# Patient Record
Sex: FEMALE | Race: WHITE | NOT HISPANIC OR LATINO | URBAN - METROPOLITAN AREA
[De-identification: names, ages, dates, MRNs, and addresses within clinical notes are randomized per-mention and may not be internally consistent; named-entity substitution may affect disease eponyms.]

---

## 2018-01-02 ENCOUNTER — LAB REQUISITION (OUTPATIENT)
Dept: LAB | Facility: OTHER | Age: 48
End: 2018-01-02

## 2018-01-02 DIAGNOSIS — Z01.84 IMMUNITY STATUS TESTING: ICD-10-CM

## 2018-01-03 LAB
MEV IGG SER IA-ACNC: POSITIVE
MUV IGG SER IA-ACNC: POSITIVE
RUBV IGG SERPL IA-ACNC: POSITIVE
VZV IGG SER IA-ACNC: POSITIVE

## 2018-07-27 ENCOUNTER — APPOINTMENT (OUTPATIENT)
Dept: WOMENS IMAGING | Facility: HOSPITAL | Age: 48
End: 2018-07-27

## 2018-07-27 PROCEDURE — 77062 BREAST TOMOSYNTHESIS BI: CPT | Performed by: RADIOLOGY

## 2018-07-27 PROCEDURE — G0279 TOMOSYNTHESIS, MAMMO: HCPCS | Performed by: RADIOLOGY

## 2018-07-27 PROCEDURE — 77066 DX MAMMO INCL CAD BI: CPT | Performed by: RADIOLOGY

## 2018-07-27 PROCEDURE — 76641 ULTRASOUND BREAST COMPLETE: CPT | Performed by: RADIOLOGY

## 2018-08-20 ENCOUNTER — APPOINTMENT (OUTPATIENT)
Dept: WOMENS IMAGING | Facility: HOSPITAL | Age: 48
End: 2018-08-20

## 2018-08-20 PROCEDURE — 76942 ECHO GUIDE FOR BIOPSY: CPT | Performed by: RADIOLOGY

## 2018-08-20 PROCEDURE — 19083 BX BREAST 1ST LESION US IMAG: CPT | Performed by: RADIOLOGY

## 2018-08-20 PROCEDURE — 19000 PUNCTURE ASPIR CYST BREAST: CPT | Performed by: RADIOLOGY

## 2018-10-29 ENCOUNTER — LAB REQUISITION (OUTPATIENT)
Dept: LAB | Facility: OTHER | Age: 48
End: 2018-10-29

## 2018-10-29 DIAGNOSIS — Z01.84 IMMUNITY STATUS TESTING: ICD-10-CM

## 2018-10-29 PROCEDURE — 86706 HEP B SURFACE ANTIBODY: CPT | Performed by: PREVENTIVE MEDICINE

## 2018-10-30 LAB — HBV SURFACE AB SER RIA-ACNC: REACTIVE

## 2019-09-05 ENCOUNTER — LAB REQUISITION (OUTPATIENT)
Dept: LAB | Facility: OTHER | Age: 49
End: 2019-09-05

## 2019-09-05 DIAGNOSIS — Z00.00 ANNUAL PHYSICAL EXAM: ICD-10-CM

## 2019-09-05 LAB — HBV SURFACE AB SER RIA-ACNC: NORMAL

## 2019-09-05 PROCEDURE — 86706 HEP B SURFACE ANTIBODY: CPT | Performed by: PHYSICAL MEDICINE & REHABILITATION

## 2019-09-05 PROCEDURE — 86481 TB AG RESPONSE T-CELL SUSP: CPT | Performed by: PHYSICAL MEDICINE & REHABILITATION

## 2019-09-07 LAB
TSPOT INTERPRETATION: NEGATIVE
TSPOT NIL CONTROL INTERPRETATION: NORMAL
TSPOT PANEL A: 0
TSPOT PANEL B: 0
TSPOT POS CONTROL INTERPRETATION: NORMAL

## 2020-06-11 ENCOUNTER — APPOINTMENT (OUTPATIENT)
Dept: WOMENS IMAGING | Facility: HOSPITAL | Age: 50
End: 2020-06-11

## 2020-06-11 PROCEDURE — 77063 BREAST TOMOSYNTHESIS BI: CPT | Performed by: RADIOLOGY

## 2020-06-11 PROCEDURE — 77067 SCR MAMMO BI INCL CAD: CPT | Performed by: RADIOLOGY

## 2020-07-07 ENCOUNTER — APPOINTMENT (OUTPATIENT)
Dept: WOMENS IMAGING | Facility: HOSPITAL | Age: 50
End: 2020-07-07

## 2020-07-07 PROCEDURE — 76641 ULTRASOUND BREAST COMPLETE: CPT | Performed by: RADIOLOGY

## 2020-07-07 PROCEDURE — 77061 BREAST TOMOSYNTHESIS UNI: CPT | Performed by: RADIOLOGY

## 2020-07-07 PROCEDURE — 77065 DX MAMMO INCL CAD UNI: CPT | Performed by: RADIOLOGY

## 2020-07-07 PROCEDURE — G0279 TOMOSYNTHESIS, MAMMO: HCPCS | Performed by: RADIOLOGY

## 2020-07-16 ENCOUNTER — APPOINTMENT (OUTPATIENT)
Dept: WOMENS IMAGING | Facility: HOSPITAL | Age: 50
End: 2020-07-16

## 2020-07-16 PROCEDURE — 19081 BX BREAST 1ST LESION STRTCTC: CPT | Performed by: RADIOLOGY

## 2020-07-17 ENCOUNTER — TELEPHONE (OUTPATIENT)
Dept: SURGERY | Facility: CLINIC | Age: 50
End: 2020-07-17

## 2020-07-23 ENCOUNTER — TELEPHONE (OUTPATIENT)
Dept: SURGERY | Facility: CLINIC | Age: 50
End: 2020-07-23

## 2020-07-24 ENCOUNTER — OFFICE VISIT (OUTPATIENT)
Dept: SURGERY | Facility: CLINIC | Age: 50
End: 2020-07-24

## 2020-07-24 ENCOUNTER — PREP FOR SURGERY (OUTPATIENT)
Dept: OTHER | Facility: HOSPITAL | Age: 50
End: 2020-07-24

## 2020-07-24 VITALS
HEIGHT: 63 IN | WEIGHT: 202 LBS | DIASTOLIC BLOOD PRESSURE: 86 MMHG | BODY MASS INDEX: 35.79 KG/M2 | SYSTOLIC BLOOD PRESSURE: 126 MMHG | TEMPERATURE: 98 F | HEART RATE: 101 BPM | OXYGEN SATURATION: 97 %

## 2020-07-24 DIAGNOSIS — N64.89 RADIAL SCAR OF RIGHT BREAST: Primary | ICD-10-CM

## 2020-07-24 DIAGNOSIS — N64.89 BREAST ASYMMETRY: ICD-10-CM

## 2020-07-24 DIAGNOSIS — E66.09 CLASS 2 OBESITY DUE TO EXCESS CALORIES WITHOUT SERIOUS COMORBIDITY WITH BODY MASS INDEX (BMI) OF 35.0 TO 35.9 IN ADULT: ICD-10-CM

## 2020-07-24 DIAGNOSIS — N60.11 FIBROCYSTIC DISEASE OF RIGHT BREAST: ICD-10-CM

## 2020-07-24 DIAGNOSIS — T14.8XXA HEMATOMA: ICD-10-CM

## 2020-07-24 PROCEDURE — 99242 OFF/OP CONSLTJ NEW/EST SF 20: CPT | Performed by: SURGERY

## 2020-07-24 RX ORDER — DIAZEPAM 5 MG/1
10 TABLET ORAL ONCE
Status: CANCELLED | OUTPATIENT
Start: 2020-10-01 | End: 2020-07-24

## 2020-07-24 RX ORDER — CEFAZOLIN SODIUM 2 G/100ML
2 INJECTION, SOLUTION INTRAVENOUS ONCE
Status: CANCELLED | OUTPATIENT
Start: 2020-10-01 | End: 2020-07-24

## 2020-07-24 RX ORDER — ALPRAZOLAM 0.5 MG/1
1 TABLET ORAL 3 TIMES DAILY
COMMUNITY

## 2020-07-24 RX ORDER — ACETAMINOPHEN 325 MG/1
650 TABLET ORAL EVERY 6 HOURS PRN
COMMUNITY

## 2020-07-24 RX ORDER — DULOXETIN HYDROCHLORIDE 60 MG/1
60 CAPSULE, DELAYED RELEASE ORAL DAILY
COMMUNITY
Start: 2020-05-24

## 2020-07-24 RX ORDER — SODIUM CHLORIDE, SODIUM LACTATE, POTASSIUM CHLORIDE, CALCIUM CHLORIDE 600; 310; 30; 20 MG/100ML; MG/100ML; MG/100ML; MG/100ML
100 INJECTION, SOLUTION INTRAVENOUS CONTINUOUS
Status: CANCELLED | OUTPATIENT
Start: 2020-10-01

## 2020-07-24 RX ORDER — ONDANSETRON 4 MG/1
4 TABLET, FILM COATED ORAL EVERY 8 HOURS PRN
Qty: 10 TABLET | Refills: 1 | Status: SHIPPED | OUTPATIENT
Start: 2020-07-24

## 2020-07-24 RX ORDER — DIPHENHYDRAMINE HCL 25 MG
25 CAPSULE ORAL EVERY 6 HOURS PRN
COMMUNITY

## 2020-07-24 RX ORDER — PANTOPRAZOLE SODIUM 40 MG/1
40 TABLET, DELAYED RELEASE ORAL DAILY
COMMUNITY
Start: 2020-06-25

## 2020-07-24 RX ORDER — POLYETHYLENE GLYCOL 3350 17 G/17G
17 POWDER, FOR SOLUTION ORAL DAILY
Qty: 1 EACH | Refills: 0 | Status: SHIPPED | OUTPATIENT
Start: 2020-07-24

## 2020-07-24 RX ORDER — NAPROXEN SODIUM 220 MG
220 TABLET ORAL 2 TIMES DAILY
COMMUNITY
End: 2020-10-01 | Stop reason: HOSPADM

## 2020-07-24 RX ORDER — HYDROCODONE BITARTRATE AND ACETAMINOPHEN 5; 325 MG/1; MG/1
TABLET ORAL
Qty: 15 TABLET | Refills: 0 | Status: SHIPPED | OUTPATIENT
Start: 2020-07-24

## 2020-07-24 NOTE — PROGRESS NOTES
Chief Complaint: Tori Sarmiento is a 49 y.o. female who was seen in consultation at the request of Tamara Mccallum MD  for radial sclerosing lesion  and abnormal breast imaging    History of Present Illness:  Patient presents with abnormal breast imaging and RIGHT breast biopsy results. She noted no new masses, skin changes, nipple discharge, nipple changes prior to her most recent imaging.    Her most recent imaging includes the followin/11/20 [facility]   BILATERAL SCREENING MAMMOGRAM WITH TMOSYNTHESIS   TORI SARMIENTO   Scattered areas of fibroglandular density. Finding 1: developing asymmetry with associated calcifications seen in the posterior one third 9:30 o’clock region of the right breast. Finding 2: stable biopsy clip upper outer region right breast. IMPRESSION: Finding 1: developing asymmetry in the right breast requires additional evaluation. Finding 2: benign negative.  BIRADS Category 0    20 [facility]   RIGHT BREAST DIAGNOSTIC MAMMOGRAM WITH TOMOSYNTHESIS  TORI SARMIENTO   Scattered areas if fibroglandular density. Finding 1: additional evaluation developing asymmetry in the right breast, 9:30 o’clock, there is a developing asymmetry measuring 30 mm with associated amorphous calcifications in the posterior one third 9:30 o’clock region of the right breast. There is suggestion of associated architectural distortion. Finding 2: there is a biopsy clip seen in the upper outer region of the right breast. RIGHT BREAST ULTRASOUND: Finding 1: normal tissue in the posterior one third 9:30 o’clock region of the right breast. There is no sonographic correlate. Finding 2: there are no suspicious masses, areas of focal shadowing or distortion seen. IMPRESSION: Finding 1: Developing asymmetry with amorphous calcifications and distortion, together SPANNING APPROXIMATLEY 30 MM, IS SUSPICIOUS. STEREOTACTIC BIOPSY IS RECEOMMED. BIRADS Category 4C.     She had a biopsy on the following day that showed:     18 [facility]   ULTRASOUND GUIDED BIOPSY   VIVIAN HO   Right breast at 9:00. A 14-gauge Achieve. A total of 5 cores [place] shaped tissue marker was placed. Demonstrate the biopsy clip to be in satisfactory position. ADDENDUM: right breast 9:00 position fibrocystic changes with Usual Hyperplasia and Apocrine Metaplasia. Negative for Carcinoma. Pathology results are radiology-pathology concordant. RECOMMENDATIONS: Bilateral breast ultrasound in 6 months.     08/20/18 [facility]  PATHOLOGY REPORT   VIVIAN HO   Right breast 9 o’clock: fibrocystic changes with usual duct hyperplasia and apocrine metaplasia. Negative for Carcinoma.     08/20/18 [facility] LEFT BREAST ULTRASOUND GUIDED CYST ASPIRATION   VIVIAN HO   Left breast sub areolar position. ¼ cc of bloody fluid. There was complete sonographic resolution. The fluid was sent to the lab for cytology. Cytology results are radiology pathology concordant.     08/20/18 [facility]  CYTOLOGY   VIVIAN HO   Left sub areolar cyst, aspiration, thin prep: no malignant cells identified. Cytologic features consistent with benign fibrocystic changes.     07/16/20 [facility]  STEREOTACTIC BIOPSY   VIVIAN HO   9:30 RIGHT BREAST. Inferior approach 12 core needle biopsy specimens were obtained using a 9 gauge Eviva. A9N0 hourglass shaped tri sourav biopsy clip was deployed within the biopsy. A two view post procedure mammogram confirmed reprehensive sampling of the finding and marker clip placement within the biopsy bed. The biopsy marker is seen at the medial and superior margin of the finding. A post biopsy hematoma was noted, with the most organized component measuring at least 4 cm. IMPRESSION: A two view mammogram shows the hourglass clip in the expected location, at the medial and superior margin of the sampled finding. Pathology is high risk and concordant.     07/16/20 [facility]   PATHOLOGY  VIVIAN HO   Breast Right 9:30 o’clock core needle biopsy: radial sclerosing lesion with associated usual  ductal hyperplasia, apocrine metaplasia and ecstatic duct/microcysts. Microcalifications (calcium phosphate and calcium oxalate) associated with radial sclerosing lesion.     She had a right breast core biopsy and [facility]  Right breast was 9:00, fibrocystic change, a cork marker left in place.  Left breast cyst aspiration was left retroareolar cytology was benign, no marker left.  She has her uterus and ovaries, is uncertain as to her menopausal status as she has had a mirena for many years.  Her family history includes the following: She has 2 daughters, one sister, one maternal aunt, 2 paternal aunts.  No family history of breast or ovarian cancer.  The patient is a dialysis nurse.  She is here for evaluation.    Review of Systems:  Review of Systems   Eyes: Positive for eye problems (reading glasses required ).   Endocrine: Positive for hot flashes.   Musculoskeletal: Positive for arthralgias.   Psychiatric/Behavioral: Positive for depression. The patient is nervous/anxious.    All other systems reviewed and are negative.       Past Medical and Surgical History:  Breast Biopsy History:  Patient has had the following breast biopsies:7/16/20 stereotactic bx radial sclerosing lesion, 2018 right breast benign   Breast Cancer HIstory:  Patient does not have a past medical history of breast cancer.  Breast Operations, and year:  None   Obstetric/Gynecologic History:  Age menstrual periods began: 13  Patient is premenopausal, first day of last period: unknown due to mirena IUD.   Number of pregnancies:`3  Number of live births:  3  Number of abortions or miscarriages: 0  Age of delivery of first child: 1  Patient breast fed, for the following lenth of time: 9 mons   Length of time taking birth control pills: 20 yrs   Patient has never taken hormone replacement  Patient has uterus and ovaries.     Past Surgical History:   Procedure Laterality Date   • BREAST BIOPSY  "     stereotactic  radial sclerosing lesion        Past Medical History:   Diagnosis Date   • Anxiety    • Depression    • GERD (gastroesophageal reflux disease)        Prior Hospitalizations, other than for surgery or childbirth, and year:  None     Social History     Socioeconomic History   • Marital status: Unknown     Spouse name: Not on file   • Number of children: Not on file   • Years of education: Not on file   • Highest education level: Not on file   Tobacco Use   • Smoking status: Never Smoker   • Smokeless tobacco: Never Used   Substance and Sexual Activity   • Alcohol use: Not Currently     Frequency: Never   • Drug use: Never     Patient is .  Patient is employed full time with the following occupation: rn   Patient drinks 3 servings of caffeine per day.    Family History:  Family History   Problem Relation Age of Onset   • Lung cancer Mother 64   • Brain cancer Mother    • Lung cancer Maternal Grandfather    • Lung cancer Paternal Grandfather        Vital Signs:  /86   Pulse 101   Temp 98 °F (36.7 °C) (Temporal)   Ht 160 cm (63\")   Wt 91.6 kg (202 lb)   LMP  (LMP Unknown)   SpO2 97%   Breastfeeding No Comment: iud  BMI 35.78 kg/m²      Medications:    Current Outpatient Medications:   •  acetaminophen (TYLENOL) 325 MG tablet, Take 650 mg by mouth Every 6 (Six) Hours As Needed for Mild Pain ., Disp: , Rfl:   •  ALPRAZolam (XANAX) 0.5 MG tablet, Take 0.5 mg by mouth 3 (Three) Times a Day., Disp: , Rfl:   •  diphenhydrAMINE (BENADRYL) 25 mg capsule, Take 25 mg by mouth Every 6 (Six) Hours As Needed for Itching., Disp: , Rfl:   •  DULoxetine (CYMBALTA) 60 MG capsule, Take 60 mg by mouth Daily., Disp: , Rfl:   •  naproxen sodium (ALEVE) 220 MG tablet, Take 220 mg by mouth 2 (two) times a day., Disp: , Rfl:   •  pantoprazole (PROTONIX) 40 MG EC tablet, Take 40 mg by mouth Daily., Disp: , Rfl:      Allergies:  No Known Allergies    Physical Examination:  /86   Pulse 101   Temp 98 " "°F (36.7 °C) (Temporal)   Ht 160 cm (63\")   Wt 91.6 kg (202 lb)   LMP  (LMP Unknown)   SpO2 97%   Breastfeeding No Comment: iud  BMI 35.78 kg/m²   General Appearance:  Patient is in no distress.  She is well kept and has an obese build.   Psychiatric:  Patient with appropriate mood and affect. Alert and oriented to self, time, and place.    Breast, RIGHT:  large sized, 40D,  asymmetric with the contralateral side, as below.  Breast skin is without erythema, edema, rashes. THere are significant ecchymoses RIGHT breast central and inferior. There is a significant postbiopsy hematoma that is a dominant central palpable mass.  There are no other visible abnormalities upon inspection during the arm-raising maneuver or with hands on hips in the sitting position. There is no nipple retraction, discharge or nipple/areolar skin changes.There are no other masses palpable in the sitting or supine positions.    Breast, LEFT:  large sized,40D,  asymmetric with the contralateral side, LEFt side is modestly larger than RIGHT.  Breast skin is without erythema, edema, rashes.  There are no visible abnormalities upon inspection during the arm-raising maneuver or with hands on hips in the sitting position. There is no nipple retraction, discharge or nipple/areolar skin changes.There are no masses palpable in the sitting or supine positions.    Lymphatic:  There is no axillary, cervical, infraclavicular, or supraclavicular adenopathy bilaterally.  Eyes:  Pupils are round and reactive to light.  Cardiovascular:  Heart rate and rhythm are regular.  Respiratory:  Lungs are clear bilaterally with no crackles or wheezes in any lung field.  Gastrointestinal:  Abdomen is soft, nondistended, and nontender.     Musculoskeletal:  Good strength in all 4 extremities.   There is good range of motion in both shoulders.    Skin:  No new skin lesions or rashes on the skin excluding the breast (see breast exam " above).        [facility]agin[facility]  BILATERAL DIAGNOSTIC MAMMOGRAM WITH TOMOSYNTHESIS   VIVIAN L VIC  Scattered areas of fibroglandular density. Finding 1: the patient indicates pain and fullness in the left breast. No suspicious finding in the region of clinical concern. Finding 2: focal asymmetry measuring 2 cm seen in the posterior one third region of the right breast at 9 o’clock located 10 cm from the nipple. There is mild associated distortion. BILATEAL REALTIME COMPLETE BREAST ULTRASOUND: Finding 1: no evidence of any solid mass or abnormal cystic elements. Finding 2: irregular elongated area of mixed echogenicity 18 x 8 x 8 mm. Finding 3: oval small complicated cyst 5x  4 x 5 mm sub-areolar region of the left breast. This is an incidental sonographic finding(s). IMPRESSION: Finding 1 benign negative. Finding 2: area of mixed echogenicity in the right breast is suspicious. Finding 3: Complicated cyst in the sub-areolar region of the left breast is suspicious. BIRADS Category 4.     20 [facility]   BILATERAL SCREENING MAMMOGRAM WITH TMOSYNTHESIS   VIVIAN L VIC   Scattered areas of fibroglandular density. Finding 1: developing asymmetry with associated calcifications seen in the posterior one third 9:30 o’clock region of the right breast. Finding 2: stable biopsy clip upper outer region right breast. IMPRESSION: Finding 1: developing asymmetry in the right breast requires additional evaluation. Finding 2: benign negative.  BIRADS Category 0    20 [facility]   RIGHT BREAST DIAGNOSTIC MAMMOGRAM WITH TOMOSYNTHESIS  VIVIAN L VIC   Scattered areas if fibroglandular density. Finding 1: additional evaluation developing asymmetry in the right breast, 9:30 o’clock, there is a developing asymmetry measuring 30 mm with associated amorphous calcifications in the posterior one third 9:30 o’clock region of the right breast. There is suggestion of associated architectural distortion. Finding 2: there is a biopsy clip  seen in the upper outer region of the right breast. RIGHT BREAST ULTRASOUND: Finding 1: normal tissue in the posterior one third 9:30 o’clock region of the right breast. There is no sonographic correlate. Finding 2: there are no suspicious masses, areas of focal shadowing or distortion seen. IMPRESSION: Finding 1: Developing asymmetry with amorphous calcifications and distortion, together SPANNING APPROXIMATLEY 30 MM, IS SUSPICIOUS. STEREOTACTIC BIOPSY IS RECEOMMED. BIRADS Category 4C.     Pathology:    08/20/18 [facility]  ULTRASOUND GUIDED BIOPSY   VIVIAN HO   Right breast at 9:00. A 14-gauge Achieve. A total of 5 cores [place] shaped tissue marker was placed. Demonstrate the biopsy clip to be in satisfactory position. ADDENDUM: right breast 9:00 position fibrocystic changes with Usual Hyperplasia and Apocrine Metaplasia. Negative for Carcinoma. Pathology results are radiology-pathology concordant. RECOMMENDATIONS: Bilateral breast ultrasound in 6 months.     08/20/18 [facility]  PATHOLOGY REPORT   VIVIAN HO   Right breast 9 o’clock: fibrocystic changes with usual duct hyperplasia and apocrine metaplasia. Negative for Carcinoma.     08/20/18 [facility] LEFT BREAST ULTRASOUND GUIDED CYST ASPIRATION   VIVIAN HO   Left breast sub areolar position. ¼ cc of bloody fluid. There was complete sonographic resolution. The fluid was sent to the lab for cytology. Cytology results are radiology pathology concordant.     08/20/18 [facility]  CYTOLOGY   VIVIAN HO   Left sub areolar cyst, aspiration, thin prep: no malignant cells identified. Cytologic features consistent with benign fibrocystic changes.     07/16/20 [facility]  STEREOTACTIC BIOPSY   VIVIAN HO   9:30 RIGHT BREAST. Inferior approach 12 core needle biopsy specimens were obtained using a 9 gauge Eviva. A9N0 hourglass shaped tri sourav biopsy clip was deployed within the biopsy. A two view post procedure mammogram confirmed reprehensive sampling of the finding and marker clip  placement within the biopsy bed. The biopsy marker is seen at the medial and superior margin of the finding. A post biopsy hematoma was noted, with the most organized component measuring at least 4 cm. IMPRESSION: A two view mammogram shows the hourglass clip in the expected location, at the medial and superior margin of the sampled finding. Pathology is high risk and concordant.     07/16/20 [facility]   PATHOLOGY  TORI HO   Breast Right 9:30 o’clock core needle biopsy: radial sclerosing lesion with associated usual ductal hyperplasia, apocrine metaplasia and ecstatic duct/microcysts. Microcalifications (calcium phosphate and calcium oxalate) associated with radial sclerosing lesion.     Procedures:      Assessment:   Diagnosis Plan   1. Radial scar of right breast     2. Hematoma     3. Breast asymmetry     4. Class 2 obesity due to excess calories without serious comorbidity with body mass index (BMI) of 35.0 to 35.9 in adult     5. Fibrocystic disease of right breast       1-2  RIGHT 9:30, posterior third- 3 cm asymmetry on mammogram, no US correlate- hourglass marker at the superior medial margin of the lesion.  Greater than 4 cm hematoma is present.  Radial sclerosing lesion with usual hyperplasia, apocrine metaplasia, ectatic ducts. Recommend excision  Note that the hourglass marker is inferior and medial to the old cork marker.    3-  LEFT > RIGHT at baseline, modestly    4-  BMI 35    5-  August 2018 right breast core biopsy 9:00, fibrocystic change, usual hyperplasia, apocrine metaplasia.  Cork marker.    Plan:  The patient goes by Tori.  She and I reviewed her history, imaging, imaging reports, examination together today.  I showed her the previous biopsy site and the new asymmetry located medial and inferior to the previous biopsy site.  We reviewed the hematoma on her mammogram as well as on her examination.  We discussed the nature of radial sclerosing lesions.  We discussed the recommendation to  remove them to alleviate the imaging finding as well as to ensure no pathologic upgrade to atypia or malignancy.  We discussed the significance of the hematoma.  I recommended that we wait at least 7 or 8 weeks before we remove this lesion.  We discussed the alternative of not removing the lesion.  She wishes to proceed with excision.  She is very understanding of the need to wait a few months because she is in significant discomfort from the hematoma presently.  I recommended a supportive bra, ice pack, or heating pad along with Tylenol or ibuprofen.  I did ask her not to put direct heat or cool on her skin but to wrap this in a towel.  We discussed the procedure of a right breast needle localized excision of radial scar.     PLan for early October for this. Discussed slim possibility of atypia or malignancy.    We discussed the risks of bleeding, infection, failure to sample.  [facility]  I will see her back for surgery.    Rx called in.      Margarita Hoang MD        Coding checked      Next Appointment:  Return for surgery.      EMR Dragon/transcription disclaimer:    Much of this encounter note is an electronic transcription/translocation of spoken language to printed text.  The electronic translation of spoken language may permit erroneous, or at times, nonsensical words or phrases to be inadvertently transcribed.  Although I have reviewed the note from such areas, some may still exist.

## 2020-07-28 ENCOUNTER — TELEPHONE (OUTPATIENT)
Dept: SURGERY | Facility: CLINIC | Age: 50
End: 2020-07-28

## 2020-07-28 PROBLEM — N64.89 RADIAL SCAR OF RIGHT BREAST: Status: ACTIVE | Noted: 2020-07-28

## 2020-07-28 NOTE — TELEPHONE ENCOUNTER
DAVID for patient to call.    Scheduled Surgery Main OR 10-1-2020  Right Breast Excisional Biopsy of Radial Scar    Arrive       6:00  Surgery   8:00    PAT 9-21-20 9:00  Return to see Dr SEE 10-12-20 arrive 9:45      Yamile

## 2020-08-03 ENCOUNTER — TELEPHONE (OUTPATIENT)
Dept: SURGERY | Facility: CLINIC | Age: 50
End: 2020-08-03

## 2020-09-17 ENCOUNTER — TRANSCRIBE ORDERS (OUTPATIENT)
Dept: PREADMISSION TESTING | Facility: HOSPITAL | Age: 50
End: 2020-09-17

## 2020-09-17 DIAGNOSIS — Z01.818 OTHER SPECIFIED PRE-OPERATIVE EXAMINATION: Primary | ICD-10-CM

## 2020-09-21 ENCOUNTER — APPOINTMENT (OUTPATIENT)
Dept: PREADMISSION TESTING | Facility: HOSPITAL | Age: 50
End: 2020-09-21

## 2020-09-21 VITALS
BODY MASS INDEX: 36.62 KG/M2 | HEART RATE: 92 BPM | WEIGHT: 206.7 LBS | SYSTOLIC BLOOD PRESSURE: 137 MMHG | OXYGEN SATURATION: 97 % | DIASTOLIC BLOOD PRESSURE: 87 MMHG | RESPIRATION RATE: 16 BRPM | TEMPERATURE: 97.8 F | HEIGHT: 63 IN

## 2020-09-21 DIAGNOSIS — N64.89 RADIAL SCAR OF RIGHT BREAST: ICD-10-CM

## 2020-09-21 LAB
ANION GAP SERPL CALCULATED.3IONS-SCNC: 4.6 MMOL/L (ref 5–15)
BUN SERPL-MCNC: 13 MG/DL (ref 6–20)
BUN/CREAT SERPL: 14.3 (ref 7–25)
CALCIUM SPEC-SCNC: 8.8 MG/DL (ref 8.6–10.5)
CHLORIDE SERPL-SCNC: 104 MMOL/L (ref 98–107)
CO2 SERPL-SCNC: 24.4 MMOL/L (ref 22–29)
CREAT SERPL-MCNC: 0.91 MG/DL (ref 0.57–1)
DEPRECATED RDW RBC AUTO: 42.8 FL (ref 37–54)
ERYTHROCYTE [DISTWIDTH] IN BLOOD BY AUTOMATED COUNT: 13.2 % (ref 12.3–15.4)
GFR SERPL CREATININE-BSD FRML MDRD: 66 ML/MIN/1.73
GLUCOSE SERPL-MCNC: 128 MG/DL (ref 65–99)
HCT VFR BLD AUTO: 43.1 % (ref 34–46.6)
HGB BLD-MCNC: 13.9 G/DL (ref 12–15.9)
MCH RBC QN AUTO: 28.5 PG (ref 26.6–33)
MCHC RBC AUTO-ENTMCNC: 32.3 G/DL (ref 31.5–35.7)
MCV RBC AUTO: 88.5 FL (ref 79–97)
PLATELET # BLD AUTO: 367 10*3/MM3 (ref 140–450)
PMV BLD AUTO: 8.8 FL (ref 6–12)
POTASSIUM SERPL-SCNC: 4.1 MMOL/L (ref 3.5–5.2)
RBC # BLD AUTO: 4.87 10*6/MM3 (ref 3.77–5.28)
SODIUM SERPL-SCNC: 133 MMOL/L (ref 136–145)
WBC # BLD AUTO: 6.05 10*3/MM3 (ref 3.4–10.8)

## 2020-09-21 PROCEDURE — 36415 COLL VENOUS BLD VENIPUNCTURE: CPT

## 2020-09-21 PROCEDURE — 80048 BASIC METABOLIC PNL TOTAL CA: CPT | Performed by: SURGERY

## 2020-09-21 PROCEDURE — 85027 COMPLETE CBC AUTOMATED: CPT | Performed by: SURGERY

## 2020-09-21 NOTE — DISCHARGE INSTRUCTIONS
Take the following medications the morning of surgery:    DULOXETINE (CYMBALTA)  PANTOPRAZOLE (PROTONIX)  ALPRAZOLAM (XANAX)  ACETAMINOPHEN (TYLENOL) IF NEEDED    If you are on prescription narcotic pain medication to control your pain you may also take that medication the morning of surgery.    General Instructions:  • Do not eat solid food after midnight the night before surgery.  • You may drink clear liquids day of surgery but must stop at least one hour before your hospital arrival time @ 0500 AM STOP DRINKING!!!!  • It is beneficial for you to have a clear drink that contains carbohydrates the day of surgery.  We suggest a 12 to 20 ounce bottle of Gatorade or Powerade for non-diabetic patients or a 12 to 20 ounce bottle of G2 or Powerade Zero for diabetic patients.     Clear liquids are liquids you can see through.  Nothing red in color.     Plain water                               Sports drinks  Sodas                                   Gelatin (Jell-O)  Fruit juices without pulp such as white grape juice and apple juice  Popsicles that contain no fruit or yogurt  Tea or coffee (no cream or milk added)  Gatorade / Powerade  G2 / Powerade Zero    • Patients who avoid smoking, chewing tobacco and alcohol for 4 weeks prior to surgery have a reduced risk of post-operative complications.  Quit smoking as many days before surgery as you can.  • Do not smoke, use chewing tobacco or drink alcohol the day of surgery.   • If applicable bring your C-PAP/ BI-PAP machine.  • Bring any papers given to you in the doctor’s office.  • Wear clean comfortable clothes.  • Do not wear contact lenses, false eyelashes or make-up.  Bring a case for your glasses.   • Bring crutches or walker if applicable.  • Remove all piercings.  Leave jewelry and any other valuables at home.  • Hair extensions with metal clips must be removed prior to surgery.  • The Pre-Admission Testing nurse will instruct you to bring medications if unable to  obtain an accurate list in Pre-Admission Testing.        Preventing a Surgical Site Infection:  • For 2 to 3 days before surgery, avoid shaving with a razor because the razor can irritate skin and make it easier to develop an infection.    • Any areas of open skin can increase the risk of a post-operative wound infection by allowing bacteria to enter and travel throughout the body.  Notify your surgeon if you have any skin wounds / rashes even if it is not near the expected surgical site.  The area will need assessed to determine if surgery should be delayed until it is healed.  • The night prior to surgery shower using a fresh bar of anti-bacterial soap (such as Dial) and clean washcloth.  Sleep in a clean bed with clean clothing.  Do not allow pets to sleep with you.  • Shower on the morning of surgery using a fresh bar of anti-bacterial soap (such as Dial) and clean washcloth.  Dry with a clean towel and dress in clean clothing.  • Ask your surgeon if you will be receiving antibiotics prior to surgery.  • Make sure you, your family, and all healthcare providers clean their hands with soap and water or an alcohol based hand  before caring for you or your wound.    Day of surgery:10- ARRIVE @ 0600 AM REPORT TO THE MAIN OR   Your arrival time is approximately two hours before your scheduled surgery time.  Upon arrival, a Pre-op nurse and Anesthesiologist will review your health history, obtain vital signs, and answer questions you may have.  The only belongings needed at this time will be a list of your home medications and if applicable your C-PAP/BI-PAP machine.  If you are staying overnight your family can leave the rest of your belongings in the car and bring them to your room later.  A Pre-op nurse will start an IV and you may receive medication in preparation for surgery, including something to help you relax.  Your family will be able to see you in the Pre-op area.  Two visitors at a time will  be allowed in the Pre-op room.  While you are in surgery your family should notify the waiting room  if they leave the waiting room area and provide a contact phone number.    Please be aware that surgery does come with discomfort.  We want to make every effort to control your discomfort so please discuss any uncontrolled symptoms with your nurse.   Your doctor will most likely have prescribed pain medications.      If you are going home after surgery you will receive individualized written care instructions before being discharged.  A responsible adult must drive you to and from the hospital on the day of your surgery and stay with you for 24 hours.    If you are staying overnight following surgery, you will be transported to your hospital room following the recovery period.  [facility] has all private rooms.    If you have any questions please call Pre-Admission Testing at [phone].  Deductibles and co-payments are collected on the day of service. Please be prepared to pay the required co-pay, deductible or deposit on the day of service as defined by your plan.    Patient Education for Self-Quarantine Process  09- Tuesday @ 1010 AM   COVID SCREEN TEST SCHEDULED   Following your COVID testing, we strongly recommend that you do not leave your home after you have been tested for COVID except to get medical care. This includes not going to work, school or to public areas.  If this is not possible for you to do please limit your activities to only required outings.  Be sure to wear a mask when you are with other people, practice social distancing and wash your hands frequently.      The following items provide additional details to keep you safe.  • Wash your hands with soap and water frequently for at least 20 seconds.   • Avoid touching your eyes, nose and mouth with unwashed hands.  • Do not share anything - utensils, towels, food from the same bowl.   • Have your own utensils,  drinking glass, dishes, towels and bedding.   • Do not have visitors.   • Do use FaceTime to stay in touch with family and friends.  • You should stay in a specific room away from others if possible.   • Stay at least 6 feet away from others in the home if you cannot have a dedicated room to yourself.   • Do not snuggle with your pet. While the CDC says there is no evidence that pets can spread COVID-19 or be infected from humans, it is probably best to avoid “petting, snuggling, being kissed or licked and sharing food (during self-quarantine)”, according to the CDC.   • Sanitize household surfaces daily. Include all high touch areas (door handles, light switches, phones, countertops, etc.)  • Do not share a bathroom with others, if possible.   • Wear a mask around others in your home if you are unable to stay in a separate room or 6 feet apart. If  you are unable to wear a mask, have your family member wear a mask if they must be within 6 feet of you.       Call your surgeon immediately if you experience any of the following symptoms:      • Sore Throat  • Shortness of Breath or difficulty breathing  • Cough  • Chills  • Body soreness or muscle pain  • Headache  • Fever  • New loss of taste or smell  • Do not arrive for your surgery ill.  Your procedure will need to be rescheduled to another time.  You will need to call your physician before the day of surgery to avoid any unnecessary exposure to hospital staff as well as other patients.        CHLORHEXIDINE CLOTH INSTRUCTIONS 10- AM PRIOR TO SURGERY   The morning of surgery follow these instructions using the Chlorhexidine cloths you've been given.  These steps reduce bacteria on the body.  Do not use the cloths near your eyes, ears mouth, genitalia or on open wounds.  Throw the cloths away after use but do not try to flush them down a toilet.      • Open and remove one cloth at a time from the package.    • Leave the cloth unfolded and begin the  bathing.  • Massage the skin with the cloths using gentle pressure to remove bacteria.  Do not scrub harshly.   • Follow the steps below with one 2% CHG cloth per area (6 total cloths).  • One cloth for neck, shoulders and chest.  • One cloth for both arms, hands, fingers and underarms (do underarms last).  • One cloth for the abdomen followed by groin.  • One cloth for right leg and foot including between the toes.  • One cloth for left leg and foot including between the toes.  • The last cloth is to be used for the back of the neck, back and buttocks.    Allow the CHG to air dry 3 minutes on the skin which will give it time to work and decrease the chance of irritation.  The skin may feel sticky until it is dry.  Do not rinse with water or any other liquid or you will lose the beneficial effects of the CHG.  If mild skin irritation occurs, do rinse the skin to remove the CHG.  Report this to the nurse at time of admission.  Do not apply lotions, creams, ointments, deodorants or perfumes after using the clothes. Dress in clean clothes before coming to the hospital.

## 2020-09-23 DIAGNOSIS — N64.89 RADIAL SCAR OF BREAST: Primary | ICD-10-CM

## 2020-09-23 RX ORDER — HYDROCODONE BITARTRATE AND ACETAMINOPHEN 5; 325 MG/1; MG/1
TABLET ORAL
Qty: 15 TABLET | Refills: 0 | Status: SHIPPED | OUTPATIENT
Start: 2020-09-23

## 2020-09-29 ENCOUNTER — LAB (OUTPATIENT)
Dept: LAB | Facility: HOSPITAL | Age: 50
End: 2020-09-29

## 2020-09-29 DIAGNOSIS — Z01.818 OTHER SPECIFIED PRE-OPERATIVE EXAMINATION: ICD-10-CM

## 2020-09-29 PROCEDURE — U0004 COV-19 TEST NON-CDC HGH THRU: HCPCS

## 2020-09-29 PROCEDURE — C9803 HOPD COVID-19 SPEC COLLECT: HCPCS

## 2020-09-30 LAB — SARS-COV-2 RNA RESP QL NAA+PROBE: NOT DETECTED

## 2020-10-01 ENCOUNTER — HOSPITAL ENCOUNTER (OUTPATIENT)
Facility: HOSPITAL | Age: 50
Setting detail: HOSPITAL OUTPATIENT SURGERY
Discharge: HOME | End: 2020-10-01
Attending: SURGERY | Admitting: SURGERY

## 2020-10-01 ENCOUNTER — ANESTHESIA EVENT (OUTPATIENT)
Dept: PERIOP | Facility: HOSPITAL | Age: 50
End: 2020-10-01

## 2020-10-01 ENCOUNTER — ANESTHESIA (OUTPATIENT)
Dept: PERIOP | Facility: HOSPITAL | Age: 50
End: 2020-10-01

## 2020-10-01 ENCOUNTER — TRANSCRIBE ORDERS (OUTPATIENT)
Dept: SURGERY | Facility: CLINIC | Age: 50
End: 2020-10-01

## 2020-10-01 ENCOUNTER — APPOINTMENT (OUTPATIENT)
Dept: GENERAL RADIOLOGY | Facility: HOSPITAL | Age: 50
End: 2020-10-01

## 2020-10-01 ENCOUNTER — HOSPITAL ENCOUNTER (OUTPATIENT)
Dept: MAMMOGRAPHY | Facility: HOSPITAL | Age: 50
Setting detail: HOSPITAL OUTPATIENT SURGERY
Discharge: HOME | End: 2020-10-01

## 2020-10-01 VITALS
DIASTOLIC BLOOD PRESSURE: 94 MMHG | SYSTOLIC BLOOD PRESSURE: 149 MMHG | WEIGHT: 204.38 LBS | HEART RATE: 85 BPM | OXYGEN SATURATION: 96 % | TEMPERATURE: 98.2 F | HEIGHT: 63 IN | BODY MASS INDEX: 36.21 KG/M2 | RESPIRATION RATE: 16 BRPM

## 2020-10-01 DIAGNOSIS — N64.89 RADIAL SCAR OF RIGHT BREAST: Primary | ICD-10-CM

## 2020-10-01 DIAGNOSIS — N64.89 RADIAL SCAR OF RIGHT BREAST: ICD-10-CM

## 2020-10-01 LAB
B-HCG UR QL: NEGATIVE
INTERNAL NEGATIVE CONTROL: NEGATIVE
INTERNAL POSITIVE CONTROL: POSITIVE
Lab: NORMAL

## 2020-10-01 PROCEDURE — 25010000002 PHENYLEPHRINE PER 1 ML: Performed by: NURSE ANESTHETIST, CERTIFIED REGISTERED

## 2020-10-01 PROCEDURE — 25010000003 LIDOCAINE 1 % SOLUTION 20 ML VIAL: Performed by: SURGERY

## 2020-10-01 PROCEDURE — 25010000003 LIDOCAINE 1 % SOLUTION: Performed by: SURGERY

## 2020-10-01 PROCEDURE — 19125 EXCISION BREAST LESION: CPT | Performed by: SURGERY

## 2020-10-01 PROCEDURE — 76098 X-RAY EXAM SURGICAL SPECIMEN: CPT

## 2020-10-01 PROCEDURE — 25010000002 PROPOFOL 10 MG/ML EMULSION: Performed by: NURSE ANESTHETIST, CERTIFIED REGISTERED

## 2020-10-01 PROCEDURE — 25010000002 DEXAMETHASONE PER 1 MG: Performed by: NURSE ANESTHETIST, CERTIFIED REGISTERED

## 2020-10-01 PROCEDURE — S0260 H&P FOR SURGERY: HCPCS | Performed by: SURGERY

## 2020-10-01 PROCEDURE — 88307 TISSUE EXAM BY PATHOLOGIST: CPT | Performed by: SURGERY

## 2020-10-01 PROCEDURE — 25010000003 CEFAZOLIN IN DEXTROSE 2-4 GM/100ML-% SOLUTION: Performed by: SURGERY

## 2020-10-01 PROCEDURE — 81025 URINE PREGNANCY TEST: CPT | Performed by: ANESTHESIOLOGY

## 2020-10-01 PROCEDURE — 25010000002 FENTANYL CITRATE (PF) 100 MCG/2ML SOLUTION: Performed by: NURSE ANESTHETIST, CERTIFIED REGISTERED

## 2020-10-01 RX ORDER — DIPHENHYDRAMINE HYDROCHLORIDE 50 MG/ML
12.5 INJECTION INTRAMUSCULAR; INTRAVENOUS
Status: DISCONTINUED | OUTPATIENT
Start: 2020-10-01 | End: 2020-10-01 | Stop reason: HOSPADM

## 2020-10-01 RX ORDER — CEFAZOLIN SODIUM 2 G/100ML
2 INJECTION, SOLUTION INTRAVENOUS ONCE
Status: COMPLETED | OUTPATIENT
Start: 2020-10-01 | End: 2020-10-01

## 2020-10-01 RX ORDER — LABETALOL HYDROCHLORIDE 5 MG/ML
5 INJECTION, SOLUTION INTRAVENOUS
Status: DISCONTINUED | OUTPATIENT
Start: 2020-10-01 | End: 2020-10-01 | Stop reason: HOSPADM

## 2020-10-01 RX ORDER — SODIUM CHLORIDE 0.9 % (FLUSH) 0.9 %
3-10 SYRINGE (ML) INJECTION AS NEEDED
Status: DISCONTINUED | OUTPATIENT
Start: 2020-10-01 | End: 2020-10-01 | Stop reason: HOSPADM

## 2020-10-01 RX ORDER — SODIUM CHLORIDE, SODIUM LACTATE, POTASSIUM CHLORIDE, CALCIUM CHLORIDE 600; 310; 30; 20 MG/100ML; MG/100ML; MG/100ML; MG/100ML
9 INJECTION, SOLUTION INTRAVENOUS CONTINUOUS
Status: DISCONTINUED | OUTPATIENT
Start: 2020-10-01 | End: 2020-10-01 | Stop reason: HOSPADM

## 2020-10-01 RX ORDER — LIDOCAINE HYDROCHLORIDE 10 MG/ML
3 INJECTION, SOLUTION INFILTRATION; PERINEURAL ONCE
Status: COMPLETED | OUTPATIENT
Start: 2020-10-01 | End: 2020-10-01

## 2020-10-01 RX ORDER — SODIUM CHLORIDE 0.9 % (FLUSH) 0.9 %
3 SYRINGE (ML) INJECTION EVERY 12 HOURS SCHEDULED
Status: DISCONTINUED | OUTPATIENT
Start: 2020-10-01 | End: 2020-10-01 | Stop reason: HOSPADM

## 2020-10-01 RX ORDER — MIDAZOLAM HYDROCHLORIDE 1 MG/ML
1 INJECTION INTRAMUSCULAR; INTRAVENOUS
Status: DISCONTINUED | OUTPATIENT
Start: 2020-10-01 | End: 2020-10-01 | Stop reason: HOSPADM

## 2020-10-01 RX ORDER — FENTANYL CITRATE 50 UG/ML
50 INJECTION, SOLUTION INTRAMUSCULAR; INTRAVENOUS
Status: DISCONTINUED | OUTPATIENT
Start: 2020-10-01 | End: 2020-10-01 | Stop reason: HOSPADM

## 2020-10-01 RX ORDER — DEXAMETHASONE SODIUM PHOSPHATE 10 MG/ML
INJECTION INTRAMUSCULAR; INTRAVENOUS AS NEEDED
Status: DISCONTINUED | OUTPATIENT
Start: 2020-10-01 | End: 2020-10-01 | Stop reason: SURG

## 2020-10-01 RX ORDER — LIDOCAINE HYDROCHLORIDE 10 MG/ML
0.5 INJECTION, SOLUTION EPIDURAL; INFILTRATION; INTRACAUDAL; PERINEURAL ONCE AS NEEDED
Status: DISCONTINUED | OUTPATIENT
Start: 2020-10-01 | End: 2020-10-01 | Stop reason: HOSPADM

## 2020-10-01 RX ORDER — DIAZEPAM 5 MG/1
10 TABLET ORAL ONCE
Status: DISCONTINUED | OUTPATIENT
Start: 2020-10-01 | End: 2020-10-01 | Stop reason: HOSPADM

## 2020-10-01 RX ORDER — PROMETHAZINE HYDROCHLORIDE 25 MG/1
25 SUPPOSITORY RECTAL ONCE AS NEEDED
Status: DISCONTINUED | OUTPATIENT
Start: 2020-10-01 | End: 2020-10-01 | Stop reason: HOSPADM

## 2020-10-01 RX ORDER — SODIUM CHLORIDE, SODIUM LACTATE, POTASSIUM CHLORIDE, CALCIUM CHLORIDE 600; 310; 30; 20 MG/100ML; MG/100ML; MG/100ML; MG/100ML
100 INJECTION, SOLUTION INTRAVENOUS CONTINUOUS
Status: DISCONTINUED | OUTPATIENT
Start: 2020-10-01 | End: 2020-10-01 | Stop reason: HOSPADM

## 2020-10-01 RX ORDER — LIDOCAINE HYDROCHLORIDE 20 MG/ML
INJECTION, SOLUTION INFILTRATION; PERINEURAL AS NEEDED
Status: DISCONTINUED | OUTPATIENT
Start: 2020-10-01 | End: 2020-10-01 | Stop reason: SURG

## 2020-10-01 RX ORDER — OXYCODONE AND ACETAMINOPHEN 7.5; 325 MG/1; MG/1
1 TABLET ORAL ONCE AS NEEDED
Status: DISCONTINUED | OUTPATIENT
Start: 2020-10-01 | End: 2020-10-01 | Stop reason: HOSPADM

## 2020-10-01 RX ORDER — FLUMAZENIL 0.1 MG/ML
0.2 INJECTION INTRAVENOUS AS NEEDED
Status: DISCONTINUED | OUTPATIENT
Start: 2020-10-01 | End: 2020-10-01 | Stop reason: HOSPADM

## 2020-10-01 RX ORDER — HYDROMORPHONE HYDROCHLORIDE 1 MG/ML
0.25 INJECTION, SOLUTION INTRAMUSCULAR; INTRAVENOUS; SUBCUTANEOUS
Status: DISCONTINUED | OUTPATIENT
Start: 2020-10-01 | End: 2020-10-01 | Stop reason: HOSPADM

## 2020-10-01 RX ORDER — DIPHENHYDRAMINE HCL 25 MG
25 CAPSULE ORAL
Status: DISCONTINUED | OUTPATIENT
Start: 2020-10-01 | End: 2020-10-01 | Stop reason: HOSPADM

## 2020-10-01 RX ORDER — GLYCOPYRROLATE 0.2 MG/ML
INJECTION INTRAMUSCULAR; INTRAVENOUS AS NEEDED
Status: DISCONTINUED | OUTPATIENT
Start: 2020-10-01 | End: 2020-10-01 | Stop reason: SURG

## 2020-10-01 RX ORDER — FAMOTIDINE 10 MG/ML
20 INJECTION, SOLUTION INTRAVENOUS ONCE
Status: DISCONTINUED | OUTPATIENT
Start: 2020-10-01 | End: 2020-10-01 | Stop reason: HOSPADM

## 2020-10-01 RX ORDER — PROMETHAZINE HYDROCHLORIDE 25 MG/1
25 TABLET ORAL ONCE AS NEEDED
Status: DISCONTINUED | OUTPATIENT
Start: 2020-10-01 | End: 2020-10-01 | Stop reason: HOSPADM

## 2020-10-01 RX ORDER — FENTANYL CITRATE 50 UG/ML
INJECTION, SOLUTION INTRAMUSCULAR; INTRAVENOUS AS NEEDED
Status: DISCONTINUED | OUTPATIENT
Start: 2020-10-01 | End: 2020-10-01 | Stop reason: SURG

## 2020-10-01 RX ORDER — PROPOFOL 10 MG/ML
VIAL (ML) INTRAVENOUS AS NEEDED
Status: DISCONTINUED | OUTPATIENT
Start: 2020-10-01 | End: 2020-10-01 | Stop reason: SURG

## 2020-10-01 RX ORDER — DIAZEPAM 5 MG/1
10 TABLET ORAL ONCE
Status: COMPLETED | OUTPATIENT
Start: 2020-10-01 | End: 2020-10-01

## 2020-10-01 RX ORDER — ONDANSETRON 2 MG/ML
4 INJECTION INTRAMUSCULAR; INTRAVENOUS ONCE AS NEEDED
Status: DISCONTINUED | OUTPATIENT
Start: 2020-10-01 | End: 2020-10-01 | Stop reason: HOSPADM

## 2020-10-01 RX ORDER — HYDROCODONE BITARTRATE AND ACETAMINOPHEN 7.5; 325 MG/1; MG/1
1 TABLET ORAL ONCE AS NEEDED
Status: DISCONTINUED | OUTPATIENT
Start: 2020-10-01 | End: 2020-10-01 | Stop reason: HOSPADM

## 2020-10-01 RX ORDER — NALOXONE HCL 0.4 MG/ML
0.2 VIAL (ML) INJECTION AS NEEDED
Status: DISCONTINUED | OUTPATIENT
Start: 2020-10-01 | End: 2020-10-01 | Stop reason: HOSPADM

## 2020-10-01 RX ORDER — EPHEDRINE SULFATE 50 MG/ML
5 INJECTION, SOLUTION INTRAVENOUS ONCE AS NEEDED
Status: DISCONTINUED | OUTPATIENT
Start: 2020-10-01 | End: 2020-10-01 | Stop reason: HOSPADM

## 2020-10-01 RX ORDER — ALBUTEROL SULFATE 2.5 MG/3ML
2.5 SOLUTION RESPIRATORY (INHALATION) ONCE AS NEEDED
Status: DISCONTINUED | OUTPATIENT
Start: 2020-10-01 | End: 2020-10-01 | Stop reason: HOSPADM

## 2020-10-01 RX ADMIN — SODIUM CHLORIDE, POTASSIUM CHLORIDE, SODIUM LACTATE AND CALCIUM CHLORIDE: 600; 310; 30; 20 INJECTION, SOLUTION INTRAVENOUS at 08:27

## 2020-10-01 RX ADMIN — DIAZEPAM 10 MG: 5 TABLET ORAL at 07:10

## 2020-10-01 RX ADMIN — PHENYLEPHRINE HYDROCHLORIDE 100 MCG: 10 INJECTION INTRAVENOUS at 09:35

## 2020-10-01 RX ADMIN — PROPOFOL 180 MG: 10 INJECTION, EMULSION INTRAVENOUS at 08:32

## 2020-10-01 RX ADMIN — CEFAZOLIN SODIUM 2 G: 2 INJECTION, SOLUTION INTRAVENOUS at 08:29

## 2020-10-01 RX ADMIN — DEXAMETHASONE SODIUM PHOSPHATE 8 MG: 10 INJECTION INTRAMUSCULAR; INTRAVENOUS at 08:55

## 2020-10-01 RX ADMIN — PHENYLEPHRINE HYDROCHLORIDE 100 MCG: 10 INJECTION INTRAVENOUS at 09:36

## 2020-10-01 RX ADMIN — GLYCOPYRROLATE 0.2 MG: 0.2 INJECTION INTRAMUSCULAR; INTRAVENOUS at 08:30

## 2020-10-01 RX ADMIN — LIDOCAINE HYDROCHLORIDE 3 ML: 10 INJECTION, SOLUTION INFILTRATION; PERINEURAL at 07:52

## 2020-10-01 RX ADMIN — FENTANYL CITRATE 50 MCG: 50 INJECTION INTRAMUSCULAR; INTRAVENOUS at 08:30

## 2020-10-01 RX ADMIN — LIDOCAINE HYDROCHLORIDE 80 MG: 20 INJECTION, SOLUTION INFILTRATION; PERINEURAL at 08:32

## 2020-10-01 NOTE — DISCHARGE INSTRUCTIONS
Please give patients the following postoperative WOUND CARE and discharge instructions:     FOR PATIENTS WHO HAVE HAD BREAST SURGERY:   IF..... patient has had reconstruction or oncoplastic closure, wound care per plastic surgeon.   IF PATIENT DOES NOT HAVE PLASTIC SURGEON, keep ace wrap (if present)  in place and dressings dry for 72 hours. May then remove ACE wrap (if present) and dressings and may shower. Leave steri strips on skin and ignore clear suture tails.  Blot dry incision with towel.  No tubs, hot tubs, pools. May wear post op bra after removing ACE. Prefer patient to wear either bra or ACE until followup visit.     FOR PATIENTS WHO HAVE HAD PORT PLACED:   Keep dressing  in place and dressings dry for 72 hours.   May then remove dressings and may shower. Leave steri strips on skin and ignore clear suture tails.  Blot dry incision with towel.  No tubs, hot tubs, pools.   May use port immediately, but prefer to keep incision dry for 3 days.  Do not remove steri strips for 7-10 days.     FOR ALL PATIENTS:   Responsible adult to stay with patient at discharge and at least 24 hours after discharge.   Call the office for any of the following: persistent bleeding, excessive bruising or swelling, excessive sleepiness or trouble breathing, severe pain, persistent nausea or vomiting, fever greater than 101.0     Postop appointment was scheduled in the office preoperatively. If patient cannot find that appointment, please call the office for a reminder on the next business day. [phone].

## 2020-10-01 NOTE — H&P
There are no changes in the history or physical exam, aside from any that are listed as an addendum at the bottom of this document.   Today, patient will go for the surgery listed in the plan below.    Chief Complaint: Tori Sarmiento is a 49 y.o. female who was seen in consultation at the request of Tamara Mccallum MD  for radial sclerosing lesion  and abnormal breast imaging    History of Present Illness:  Patient presents with abnormal breast imaging and RIGHT breast biopsy results. She noted no new masses, skin changes, nipple discharge, nipple changes prior to her most recent imaging.    Her most recent imaging includes the followin/11/20 [facility]   BILATERAL SCREENING MAMMOGRAM WITH TMOSYNTHESIS   TORI SOURAV SARMIENTO   Scattered areas of fibroglandular density. Finding 1: developing asymmetry with associated calcifications seen in the posterior one third 9:30 o’clock region of the right breast. Finding 2: stable biopsy clip upper outer region right breast. IMPRESSION: Finding 1: developing asymmetry in the right breast requires additional evaluation. Finding 2: benign negative.  BIRADS Category 0    20 [facility]   RIGHT BREAST DIAGNOSTIC MAMMOGRAM WITH TOMOSYNTHESIS  TORI SOURAV SARMIENTO   Scattered areas if fibroglandular density. Finding 1: additional evaluation developing asymmetry in the right breast, 9:30 o’clock, there is a developing asymmetry measuring 30 mm with associated amorphous calcifications in the posterior one third 9:30 o’clock region of the right breast. There is suggestion of associated architectural distortion. Finding 2: there is a biopsy clip seen in the upper outer region of the right breast. RIGHT BREAST ULTRASOUND: Finding 1: normal tissue in the posterior one third 9:30 o’clock region of the right breast. There is no sonographic correlate. Finding 2: there are no suspicious masses, areas of focal shadowing or distortion seen. IMPRESSION: Finding 1: Developing asymmetry with amorphous  calcifications and distortion, together SPANNING APPROXIMATLEY 30 MM, IS SUSPICIOUS. STEREOTACTIC BIOPSY IS RECEOMMED. BIRADS Category 4C.     She had a biopsy on the following day that showed:     08/20/18 [facility]  ULTRASOUND GUIDED BIOPSY   VIVIAN HO   Right breast at 9:00. A 14-gauge Achieve. A total of 5 cores [place] shaped tissue marker was placed. Demonstrate the biopsy clip to be in satisfactory position. ADDENDUM: right breast 9:00 position fibrocystic changes with Usual Hyperplasia and Apocrine Metaplasia. Negative for Carcinoma. Pathology results are radiology-pathology concordant. RECOMMENDATIONS: Bilateral breast ultrasound in 6 months.     08/20/18 [facility]  PATHOLOGY REPORT   VIVIAN HO   Right breast 9 o’clock: fibrocystic changes with usual duct hyperplasia and apocrine metaplasia. Negative for Carcinoma.     08/20/18 [facility] LEFT BREAST ULTRASOUND GUIDED CYST ASPIRATION   VIVIAN HO   Left breast sub areolar position. ¼ cc of bloody fluid. There was complete sonographic resolution. The fluid was sent to the lab for cytology. Cytology results are radiology pathology concordant.     08/20/18 [facility]  CYTOLOGY   VIVIAN HO   Left sub areolar cyst, aspiration, thin prep: no malignant cells identified. Cytologic features consistent with benign fibrocystic changes.     07/16/20 [facility]  STEREOTACTIC BIOPSY   VIVIAN HO   9:30 RIGHT BREAST. Inferior approach 12 core needle biopsy specimens were obtained using a 9 gauge Eviva. A9N0 hourglass shaped tri sourav biopsy clip was deployed within the biopsy. A two view post procedure mammogram confirmed reprehensive sampling of the finding and marker clip placement within the biopsy bed. The biopsy marker is seen at the medial and superior margin of the finding. A post biopsy hematoma was noted, with the most organized component measuring at least 4 cm. IMPRESSION: A two view mammogram shows the hourglass clip in the expected location, at the medial and superior  margin of the sampled finding. Pathology is high risk and concordant.     07/16/20 [facility]   PATHOLOGY  VIVIAN HO   Breast Right 9:30 o’clock core needle biopsy: radial sclerosing lesion with associated usual ductal hyperplasia, apocrine metaplasia and ecstatic duct/microcysts. Microcalifications (calcium phosphate and calcium oxalate) associated with radial sclerosing lesion.     She had a right breast core biopsy and [facility]  Right breast was 9:00, fibrocystic change, a cork marker left in place.  Left breast cyst aspiration was left retroareolar cytology was benign, no marker left.  She has her uterus and ovaries, is uncertain as to her menopausal status as she has had a mirena for many years.  Her family history includes the following: She has 2 daughters, one sister, one maternal aunt, 2 paternal aunts.  No family history of breast or ovarian cancer.  The patient is a dialysis nurse.  She is here for evaluation.    Review of Systems:  Review of Systems   Eyes: Positive for eye problems (reading glasses required ).   Endocrine: Positive for hot flashes.   Musculoskeletal: Positive for arthralgias.   Psychiatric/Behavioral: Positive for depression. The patient is nervous/anxious.    All other systems reviewed and are negative.       Past Medical and Surgical History:  Breast Biopsy History:  Patient has had the following breast biopsies:7/16/20 stereotactic bx radial sclerosing lesion, 2018 right breast benign   Breast Cancer HIstory:  Patient does not have a past medical history of breast cancer.  Breast Operations, and year:  None   Obstetric/Gynecologic History:  Age menstrual periods began: 13  Patient is premenopausal, first day of last period: unknown due to mirena IUD.   Number of pregnancies:`3  Number of live births:  3  Number of abortions or miscarriages: 0  Age of delivery of first child: 1  Patient breast fed, for the following lenth of  "time: 9 mons   Length of time taking birth control pills: 20 yrs   Patient has never taken hormone replacement  Patient has uterus and ovaries.     Past Surgical History:   Procedure Laterality Date   • BREAST BIOPSY      stereotactic  radial sclerosing lesion        Past Medical History:   Diagnosis Date   • Anxiety    • Depression    • GERD (gastroesophageal reflux disease)        Prior Hospitalizations, other than for surgery or childbirth, and year:  None     Social History     Socioeconomic History   • Marital status: Unknown     Spouse name: Not on file   • Number of children: Not on file   • Years of education: Not on file   • Highest education level: Not on file   Tobacco Use   • Smoking status: Never Smoker   • Smokeless tobacco: Never Used   Substance and Sexual Activity   • Alcohol use: Not Currently     Frequency: Never   • Drug use: Never     Patient is .  Patient is employed full time with the following occupation: rn   Patient drinks 3 servings of caffeine per day.    Family History:  Family History   Problem Relation Age of Onset   • Lung cancer Mother 64   • Brain cancer Mother    • Lung cancer Maternal Grandfather    • Lung cancer Paternal Grandfather        Vital Signs:  /86   Pulse 101   Temp 98 °F (36.7 °C) (Temporal)   Ht 160 cm (63\")   Wt 91.6 kg (202 lb)   LMP  (LMP Unknown)   SpO2 97%   Breastfeeding No Comment: iud  BMI 35.78 kg/m²      Medications:    Current Outpatient Medications:   •  acetaminophen (TYLENOL) 325 MG tablet, Take 650 mg by mouth Every 6 (Six) Hours As Needed for Mild Pain ., Disp: , Rfl:   •  ALPRAZolam (XANAX) 0.5 MG tablet, Take 0.5 mg by mouth 3 (Three) Times a Day., Disp: , Rfl:   •  diphenhydrAMINE (BENADRYL) 25 mg capsule, Take 25 mg by mouth Every 6 (Six) Hours As Needed for Itching., Disp: , Rfl:   •  DULoxetine (CYMBALTA) 60 MG capsule, Take 60 mg by mouth Daily., Disp: , Rfl:   •  naproxen sodium (ALEVE) 220 MG tablet, Take 220 mg by mouth 2 " "(two) times a day., Disp: , Rfl:   •  pantoprazole (PROTONIX) 40 MG EC tablet, Take 40 mg by mouth Daily., Disp: , Rfl:      Allergies:  No Known Allergies    Physical Examination:  /86   Pulse 101   Temp 98 °F (36.7 °C) (Temporal)   Ht 160 cm (63\")   Wt 91.6 kg (202 lb)   LMP  (LMP Unknown)   SpO2 97%   Breastfeeding No Comment: iud  BMI 35.78 kg/m²   General Appearance:  Patient is in no distress.  She is well kept and has an obese build.   Psychiatric:  Patient with appropriate mood and affect. Alert and oriented to self, time, and place.    Breast, RIGHT:  large sized, 40D,  asymmetric with the contralateral side, as below.  Breast skin is without erythema, edema, rashes. THere are significant ecchymoses RIGHT breast central and inferior. There is a significant postbiopsy hematoma that is a dominant central palpable mass.  There are no other visible abnormalities upon inspection during the arm-raising maneuver or with hands on hips in the sitting position. There is no nipple retraction, discharge or nipple/areolar skin changes.There are no other masses palpable in the sitting or supine positions.    Breast, LEFT:  large sized,40D,  asymmetric with the contralateral side, LEFt side is modestly larger than RIGHT.  Breast skin is without erythema, edema, rashes.  There are no visible abnormalities upon inspection during the arm-raising maneuver or with hands on hips in the sitting position. There is no nipple retraction, discharge or nipple/areolar skin changes.There are no masses palpable in the sitting or supine positions.    Lymphatic:  There is no axillary, cervical, infraclavicular, or supraclavicular adenopathy bilaterally.  Eyes:  Pupils are round and reactive to light.  Cardiovascular:  Heart rate and rhythm are regular.  Respiratory:  Lungs are clear bilaterally with no crackles or wheezes in any lung field.  Gastrointestinal:  Abdomen is soft, nondistended, and nontender. "     Musculoskeletal:  Good strength in all 4 extremities.   There is good range of motion in both shoulders.    Skin:  No new skin lesions or rashes on the skin excluding the breast (see breast exam above).        [facility]agin18 [facility]  BILATERAL DIAGNOSTIC MAMMOGRAM WITH TOMOSYNTHESIS   VIVIAN L VIC  Scattered areas of fibroglandular density. Finding 1: the patient indicates pain and fullness in the left breast. No suspicious finding in the region of clinical concern. Finding 2: focal asymmetry measuring 2 cm seen in the posterior one third region of the right breast at 9 o’clock located 10 cm from the nipple. There is mild associated distortion. BILATEAL REALTIME COMPLETE BREAST ULTRASOUND: Finding 1: no evidence of any solid mass or abnormal cystic elements. Finding 2: irregular elongated area of mixed echogenicity 18 x 8 x 8 mm. Finding 3: oval small complicated cyst 5x  4 x 5 mm sub-areolar region of the left breast. This is an incidental sonographic finding(s). IMPRESSION: Finding 1 benign negative. Finding 2: area of mixed echogenicity in the right breast is suspicious. Finding 3: Complicated cyst in the sub-areolar region of the left breast is suspicious. BIRADS Category 4.     20 [facility]   BILATERAL SCREENING MAMMOGRAM WITH TMOSYNTHESIS   VIVIAN L VIC   Scattered areas of fibroglandular density. Finding 1: developing asymmetry with associated calcifications seen in the posterior one third 9:30 o’clock region of the right breast. Finding 2: stable biopsy clip upper outer region right breast. IMPRESSION: Finding 1: developing asymmetry in the right breast requires additional evaluation. Finding 2: benign negative.  BIRADS Category 0    20 [facility]   RIGHT BREAST DIAGNOSTIC MAMMOGRAM WITH TOMOSYNTHESIS  VIVIAN L VIC   Scattered areas if fibroglandular density. Finding 1: additional evaluation developing asymmetry in the right breast, 9:30 o’clock, there is a developing asymmetry measuring 30 mm with  associated amorphous calcifications in the posterior one third 9:30 o’clock region of the right breast. There is suggestion of associated architectural distortion. Finding 2: there is a biopsy clip seen in the upper outer region of the right breast. RIGHT BREAST ULTRASOUND: Finding 1: normal tissue in the posterior one third 9:30 o’clock region of the right breast. There is no sonographic correlate. Finding 2: there are no suspicious masses, areas of focal shadowing or distortion seen. IMPRESSION: Finding 1: Developing asymmetry with amorphous calcifications and distortion, together SPANNING APPROXIMATLEY 30 MM, IS SUSPICIOUS. STEREOTACTIC BIOPSY IS RECEOMMED. BIRADS Category 4C.     Pathology:    08/20/18 [facility]  ULTRASOUND GUIDED BIOPSY   VIVIAN HO   Right breast at 9:00. A 14-gauge Achieve. A total of 5 cores [place] shaped tissue marker was placed. Demonstrate the biopsy clip to be in satisfactory position. ADDENDUM: right breast 9:00 position fibrocystic changes with Usual Hyperplasia and Apocrine Metaplasia. Negative for Carcinoma. Pathology results are radiology-pathology concordant. RECOMMENDATIONS: Bilateral breast ultrasound in 6 months.     08/20/18 [facility]  PATHOLOGY REPORT   VIVIAN HO   Right breast 9 o’clock: fibrocystic changes with usual duct hyperplasia and apocrine metaplasia. Negative for Carcinoma.     08/20/18 [facility] LEFT BREAST ULTRASOUND GUIDED CYST ASPIRATION   VIVIAN HO   Left breast sub areolar position. ¼ cc of bloody fluid. There was complete sonographic resolution. The fluid was sent to the lab for cytology. Cytology results are radiology pathology concordant.     08/20/18 [facility]  CYTOLOGY   VIVIAN HO   Left sub areolar cyst, aspiration, thin prep: no malignant cells identified. Cytologic features consistent with benign fibrocystic changes.     07/16/20 [facility]  STEREOTACTIC BIOPSY   VIVIAN HO   9:30 RIGHT BREAST. Inferior approach 12 core needle biopsy specimens were obtained  using a 9 gauge Eviva. A9N0 hourglass shaped tri sourav biopsy clip was deployed within the biopsy. A two view post procedure mammogram confirmed reprehensive sampling of the finding and marker clip placement within the biopsy bed. The biopsy marker is seen at the medial and superior margin of the finding. A post biopsy hematoma was noted, with the most organized component measuring at least 4 cm. IMPRESSION: A two view mammogram shows the hourglass clip in the expected location, at the medial and superior margin of the sampled finding. Pathology is high risk and concordant.     07/16/20 [facility]   PATHOLOGY  TORI HO   Breast Right 9:30 o’clock core needle biopsy: radial sclerosing lesion with associated usual ductal hyperplasia, apocrine metaplasia and ecstatic duct/microcysts. Microcalifications (calcium phosphate and calcium oxalate) associated with radial sclerosing lesion.     Procedures:      Assessment:   Diagnosis Plan   1. Radial scar of right breast     2. Hematoma     3. Breast asymmetry     4. Class 2 obesity due to excess calories without serious comorbidity with body mass index (BMI) of 35.0 to 35.9 in adult     5. Fibrocystic disease of right breast       1-2  RIGHT 9:30, posterior third- 3 cm asymmetry on mammogram, no US correlate- hourglass marker at the superior medial margin of the lesion.  Greater than 4 cm hematoma is present.  Radial sclerosing lesion with usual hyperplasia, apocrine metaplasia, ectatic ducts. Recommend excision  Note that the hourglass marker is inferior and medial to the old cork marker.    3-  LEFT > RIGHT at baseline, modestly    4-  BMI 35    5-  August 2018 right breast core biopsy 9:00, fibrocystic change, usual hyperplasia, apocrine metaplasia.  Cork marker.    Plan:  The patient goes by Tori.  She and I reviewed her history, imaging, imaging reports, examination together today.  I showed her the previous biopsy site and the new asymmetry located medial and inferior  to the previous biopsy site.  We reviewed the hematoma on her mammogram as well as on her examination.  We discussed the nature of radial sclerosing lesions.  We discussed the recommendation to remove them to alleviate the imaging finding as well as to ensure no pathologic upgrade to atypia or malignancy.  We discussed the significance of the hematoma.  I recommended that we wait at least 7 or 8 weeks before we remove this lesion.  We discussed the alternative of not removing the lesion.  She wishes to proceed with excision.  She is very understanding of the need to wait a few months because she is in significant discomfort from the hematoma presently.  I recommended a supportive bra, ice pack, or heating pad along with Tylenol or ibuprofen.  I did ask her not to put direct heat or cool on her skin but to wrap this in a towel.  We discussed the procedure of a right breast needle localized excision of radial scar.     PLan for early October for this. Discussed slim possibility of atypia or malignancy.    We discussed the risks of bleeding, infection, failure to sample.  [facility]  I will see her back for surgery.    Rx called in.      Margarita Hoang MD        Coding checked      Next Appointment:  Return for surgery.      EMR Dragon/transcription disclaimer:    Much of this encounter note is an electronic transcription/translocation of spoken language to printed text.  The electronic translation of spoken language may permit erroneous, or at times, nonsensical words or phrases to be inadvertently transcribed.  Although I have reviewed the note from such areas, some may still exist.

## 2020-10-01 NOTE — PERIOPERATIVE NURSING NOTE
Unable to reach Dr Hoang per phone to confirm procedure an d whether or not patient needs needle localization.

## 2020-10-01 NOTE — OP NOTE
Operative note    Preoperative Diagnosis: radial scar    Postoperative Diagnosis: radial scar    Procedure Performed:right breast needle localized excisional biopsy    Dictating physician and surgeon: Margarita Hoang MD    EBL:10cc    FINDINGS AND DESCRIPTION OF PROCEDURE:     The patient was brought to the operating room and placed on the table in the supine position. After adequate general LMA anesthesia was obtained, we sterilly prepped and draped the breast and. We did a time out to identify correct patient and correct operative site.  She did receive IV antibiotic within an hour of and prior to incision.     I reviewed the mammographic localization films and planned the following incision accordingly:  Radial lateral RIGHT incision removing no skin. 7.5 cm needle.     I used a 15 blade in incise the skin, dissected around the localization needle and removed the specimen. I labelled this: long stitch lateral short stitch superior, double stitch deep. There was significant residual clotted hematoma, old brown, in the vicinity of the biopsy site.    I took an xray with the faxitron and confirmed the lesion of interest/marker in the specimen.    I then irrigated, assured hemostasis and closed a deep layer of breast tissue using a running 2-0 vicryl, followed by a superficial layer of breast using a running 2-0 vicryl. I then closed the skin in 2 layers- the first being n interrupted 3-0 vicryl layer, followed by a running 4-0 monocryl.    I then applied lengthwise 1/2 inch steri strips, an island dressing. Then I wrapped her in 4x4s and a 6 inch ACE wrap.    She tolerated the procedure well, there were no immediate complications, and all counts were correct at the end of the case.

## 2020-10-01 NOTE — ANESTHESIA PREPROCEDURE EVALUATION
Anesthesia Evaluation     NPO Solid Status: > 8 hours             Airway   Mallampati: II  TM distance: >3 FB  Neck ROM: full  No difficulty expected  Dental - normal exam     Pulmonary - normal exam   Cardiovascular - normal exam        Neuro/Psych  (+) psychiatric history Anxiety,     GI/Hepatic/Renal/Endo    (+)  GERD,      Musculoskeletal     Abdominal    Substance History      OB/GYN          Other                        Anesthesia Plan    ASA 2     general     intravenous induction     Anesthetic plan, all risks, benefits, and alternatives have been provided, discussed and informed consent has been obtained with: patient.

## 2020-10-01 NOTE — ANESTHESIA POSTPROCEDURE EVALUATION
"Patient: Tori Sarmiento    Procedure Summary     Date: 10/01/20 Room / Location: [facility] OR [address] [place] [address] MAIN OR    Anesthesia Start: 0827 Anesthesia Stop: 0954    Procedure: right breast needle localized excision of radial scar. (Right Breast) Diagnosis:       Radial scar of right breast      (Radial scar of right breast [L90.5])    Surgeon: Margarita Hoang MD Provider: Leela Kwan MD    Anesthesia Type: general ASA Status: 2          Anesthesia Type: general    Vitals  Vitals Value Taken Time   /98 10/01/20 1045   Temp 36.8 °C (98.2 °F) 10/01/20 1050   Pulse 101 10/01/20 1052   Resp 16 10/01/20 1045   SpO2 97 % 10/01/20 1052   Vitals shown include unvalidated device data.        Post Anesthesia Care and Evaluation    Patient location during evaluation: bedside  Patient participation: complete - patient participated  Level of consciousness: sleepy but conscious  Pain management: adequate  Airway patency: patent  Anesthetic complications: No anesthetic complications    Cardiovascular status: acceptable  Respiratory status: acceptable  Hydration status: acceptable    Comments: */94   Pulse 85   Temp 36.8 °C (98.2 °F) (Oral)   Resp 16   Ht 160 cm (63\")   Wt 92.7 kg (204 lb 6 oz)   SpO2 96%   BMI 36.20 kg/m²         " Purse String (Intermediate) Text: Given the location of the defect and the characteristics of the surrounding skin a purse string intermediate closure was deemed most appropriate.  Undermining was performed circumfirentially around the surgical defect.  A purse string suture was then placed and tightened.

## 2020-10-01 NOTE — ANESTHESIA PROCEDURE NOTES
Airway  Urgency: elective    Date/Time: 10/1/2020 8:35 AM  Airway not difficult    General Information and Staff    Patient location during procedure: OR  Anesthesiologist: Leela Kwan MD  CRNA: Flaquito Beatty CRNA    Indications and Patient Condition  Indications for airway management: airway protection    Preoxygenated: yes  MILS maintained throughout  Mask difficulty assessment: 1 - vent by mask    Final Airway Details  Final airway type: supraglottic airway      Successful airway: unique  Size 4  Cuff Pressure (cm H2O): 15  Airway Seal Pressure (cm H2O): 22    Number of attempts at approach: 1

## 2020-10-02 LAB
CYTO UR: NORMAL
LAB AP CASE REPORT: NORMAL
LAB AP DIAGNOSIS COMMENT: NORMAL
PATH REPORT.FINAL DX SPEC: NORMAL
PATH REPORT.GROSS SPEC: NORMAL

## 2020-10-05 ENCOUNTER — TELEPHONE (OUTPATIENT)
Dept: SURGERY | Facility: CLINIC | Age: 50
End: 2020-10-05

## 2020-10-05 NOTE — TELEPHONE ENCOUNTER
Pathology from right breast needle localized excision of radial scar October 1, 2020 returned as  Benign breast tissue with extensive hemorrhagic biopsy site with clip retrieved.  Fibrocystic change.  No atypical hyperplasia in situ or invasive carcinoma.  We will let her know.    Final Diagnosis   1. Right Breast, Oriented Needle Localization Lumpectomy (32 grams):               A. Benign breast tissue with extensive hemorrhagic biopsy site (clip retrieved).               B. Scattered foci of florid duct hyperplasia.               C. Fibrocystic change and apocrine cysts.               D. No atypical hyperplasia, in situ, nor invasive carcinoma identified (margins clear).     swm/pkm    Electronically signed by Mayra Partida MD on 10/2/2020 at 1249   Comment    Representative slides from this are shared internally with Francesca Mantilla and Mauri, who concur.

## 2020-10-05 NOTE — TELEPHONE ENCOUNTER
I called to let her know that her surgical pathology was benign tissue. I had to leave patient a message to call me back.     Confirmed.

## 2020-10-20 ENCOUNTER — LAB REQUISITION (OUTPATIENT)
Dept: LAB | Facility: OTHER | Age: 50
End: 2020-10-20

## 2020-10-20 ENCOUNTER — OFFICE VISIT (OUTPATIENT)
Dept: SURGERY | Facility: CLINIC | Age: 50
End: 2020-10-20

## 2020-10-20 VITALS
OXYGEN SATURATION: 97 % | HEIGHT: 60 IN | DIASTOLIC BLOOD PRESSURE: 94 MMHG | HEART RATE: 94 BPM | WEIGHT: 215 LBS | BODY MASS INDEX: 42.21 KG/M2 | TEMPERATURE: 97.1 F | SYSTOLIC BLOOD PRESSURE: 137 MMHG

## 2020-10-20 DIAGNOSIS — N64.89 RADIAL SCAR OF BREAST: Primary | ICD-10-CM

## 2020-10-20 DIAGNOSIS — E66.09 CLASS 2 OBESITY DUE TO EXCESS CALORIES WITHOUT SERIOUS COMORBIDITY WITH BODY MASS INDEX (BMI) OF 35.0 TO 35.9 IN ADULT: ICD-10-CM

## 2020-10-20 DIAGNOSIS — Z00.00 ROUTINE GENERAL MEDICAL EXAMINATION AT A HEALTH CARE FACILITY: ICD-10-CM

## 2020-10-20 DIAGNOSIS — T14.8XXA HEMATOMA: ICD-10-CM

## 2020-10-20 DIAGNOSIS — N64.89 BREAST ASYMMETRY: ICD-10-CM

## 2020-10-20 DIAGNOSIS — N60.11 FIBROCYSTIC DISEASE OF RIGHT BREAST: ICD-10-CM

## 2020-10-20 PROCEDURE — 99024 POSTOP FOLLOW-UP VISIT: CPT | Performed by: SURGERY

## 2020-10-20 PROCEDURE — 86481 TB AG RESPONSE T-CELL SUSP: CPT | Performed by: PHYSICAL MEDICINE & REHABILITATION

## 2020-10-20 NOTE — PROGRESS NOTES
Chief Complaint: Tori Sarmiento is a 49 y.o. female who was seen in consultation at the request of Tamara Mccallum MD  for radial sclerosing lesion , abnormal breast imaging and a postoperative visit    History of Present Illness:  Patient presents with abnormal breast imaging and RIGHT breast biopsy results. She noted no new masses, skin changes, nipple discharge, nipple changes prior to her most recent imaging.    Her most recent imaging includes the followin/11/20 [facility]   BILATERAL SCREENING MAMMOGRAM WITH TMOSYNTHESIS   TORI SARMIENTO   Scattered areas of fibroglandular density. Finding 1: developing asymmetry with associated calcifications seen in the posterior one third 9:30 o’clock region of the right breast. Finding 2: stable biopsy clip upper outer region right breast. IMPRESSION: Finding 1: developing asymmetry in the right breast requires additional evaluation. Finding 2: benign negative.  BIRADS Category 0    20 [facility]   RIGHT BREAST DIAGNOSTIC MAMMOGRAM WITH TOMOSYNTHESIS  TORI SARMIENTO   Scattered areas if fibroglandular density. Finding 1: additional evaluation developing asymmetry in the right breast, 9:30 o’clock, there is a developing asymmetry measuring 30 mm with associated amorphous calcifications in the posterior one third 9:30 o’clock region of the right breast. There is suggestion of associated architectural distortion. Finding 2: there is a biopsy clip seen in the upper outer region of the right breast. RIGHT BREAST ULTRASOUND: Finding 1: normal tissue in the posterior one third 9:30 o’clock region of the right breast. There is no sonographic correlate. Finding 2: there are no suspicious masses, areas of focal shadowing or distortion seen. IMPRESSION: Finding 1: Developing asymmetry with amorphous calcifications and distortion, together SPANNING APPROXIMATLEY 30 MM, IS SUSPICIOUS. STEREOTACTIC BIOPSY IS RECEOMMED. BIRADS Category 4C.     She had a biopsy on the following day that  showed:     08/20/18 [facility]  ULTRASOUND GUIDED BIOPSY   VIVIAN HO   Right breast at 9:00. A 14-gauge Achieve. A total of 5 cores [place] shaped tissue marker was placed. Demonstrate the biopsy clip to be in satisfactory position. ADDENDUM: right breast 9:00 position fibrocystic changes with Usual Hyperplasia and Apocrine Metaplasia. Negative for Carcinoma. Pathology results are radiology-pathology concordant. RECOMMENDATIONS: Bilateral breast ultrasound in 6 months.     08/20/18 [facility]  PATHOLOGY REPORT   VIVIAN HO   Right breast 9 o’clock: fibrocystic changes with usual duct hyperplasia and apocrine metaplasia. Negative for Carcinoma.     08/20/18 [facility] LEFT BREAST ULTRASOUND GUIDED CYST ASPIRATION   VIVIAN HO   Left breast sub areolar position. ¼ cc of bloody fluid. There was complete sonographic resolution. The fluid was sent to the lab for cytology. Cytology results are radiology pathology concordant.     08/20/18 [facility]  CYTOLOGY   VIVIAN HO   Left sub areolar cyst, aspiration, thin prep: no malignant cells identified. Cytologic features consistent with benign fibrocystic changes.     07/16/20 [facility]  STEREOTACTIC BIOPSY   VIVIAN HO   9:30 RIGHT BREAST. Inferior approach 12 core needle biopsy specimens were obtained using a 9 gauge Eviva. A9N0 hourglass shaped tri sourav biopsy clip was deployed within the biopsy. A two view post procedure mammogram confirmed reprehensive sampling of the finding and marker clip placement within the biopsy bed. The biopsy marker is seen at the medial and superior margin of the finding. A post biopsy hematoma was noted, with the most organized component measuring at least 4 cm. IMPRESSION: A two view mammogram shows the hourglass clip in the expected location, at the medial and superior margin of the sampled finding. Pathology is high risk and concordant.     07/16/20 [facility]   PATHOLOGY  VIVIAN HO   Breast Right 9:30 o’clock core needle biopsy: radial sclerosing  lesion with associated usual ductal hyperplasia, apocrine metaplasia and ecstatic duct/microcysts. Microcalifications (calcium phosphate and calcium oxalate) associated with radial sclerosing lesion.     She had a right breast core biopsy and a [facility]  Right breast was 9:00, fibrocystic change, a cork marker left in place.  Left breast cyst aspiration was left retroareolar cytology was benign, no marker left.  She has her uterus and ovaries, is uncertain as to her menopausal status as she has had a mirena for many years.  Her family history includes the following: She has 2 daughters, one sister, one maternal aunt, 2 paternal aunts.  No family history of breast or ovarian cancer.  The patient is a dialysis nurse.    Interval History:  Pathology from right breast needle localized excision of radial scar October 1, 2020 returned as  Benign breast tissue with extensive hemorrhagic biopsy site with clip retrieved.  Fibrocystic change.  No atypical hyperplasia in situ or invasive carcinoma.    Denies any redness warmth or drainage from the incision.  She denies any significant discomfort.  She is here for review    Review of Systems:  Review of Systems   Constitutional: Positive for unexpected weight change (13 lb wt gain ).   Eyes: Positive for eye problems (reading glasses required ).   Endocrine: Positive for hot flashes.   Musculoskeletal: Positive for arthralgias.   Psychiatric/Behavioral: Positive for depression. The patient is nervous/anxious.    All other systems reviewed and are negative.       Past Medical and Surgical History:  Breast Biopsy History:  Patient has had the following breast biopsies:7/16/20 stereotactic bx radial sclerosing lesion, 2018 right breast benign   Breast Cancer HIstory:  Patient does not have a past medical history of breast cancer.  Breast Operations, and year:  None   Obstetric/Gynecologic History:  Age menstrual periods began:  "13  Patient is premenopausal, first day of last period: unknown due to mirena IUD.   Number of pregnancies:`3  Number of live births:  3  Number of abortions or miscarriages: 0  Age of delivery of first child: 0  Patient breast fed, for the following lenth of time: 9 mons   Length of time taking birth control pills: 20 yrs   Patient has never taken hormone replacement  Patient has uterus and ovaries.     Past Surgical History:   Procedure Laterality Date   • BREAST BIOPSY Right 2020    stereotactic  radial sclerosing lesion (BENIGN)   • BREAST BIOPSY Right 2018    BENGIN   • BREAST BIOPSY Right 10/1/2020    Procedure: right breast needle localized excision of radial scar.;  Surgeon: Margarita Hoang MD;  Location: [facility];  Service: General;  Laterality: Right;       Past Medical History:   Diagnosis Date   • Anxiety    • Depression    • Depression    • GERD (gastroesophageal reflux disease)    • IUD (intrauterine device) in place     MIRENA       Prior Hospitalizations, other than for surgery or childbirth, and year:  None     Social History     Socioeconomic History   • Marital status: Unknown     Spouse name: Not on file   • Number of children: Not on file   • Years of education: Not on file   • Highest education level: Not on file   Tobacco Use   • Smoking status: Never Smoker   • Smokeless tobacco: Never Used   Substance and Sexual Activity   • Alcohol use: Yes     Frequency: Never     Comment: YEARLY    • Drug use: Never   • Sexual activity: Defer     Patient is .  Patient is employed full time with the following occupation: rn   Patient drinks 3 servings of caffeine per day.    Family History:  Family History   Problem Relation Age of Onset   • Lung cancer Mother 64   • Brain cancer Mother    • Lung cancer Maternal Grandfather    • Lung cancer Paternal Grandfather    • Malig Hyperthermia Neg Hx        Vital Signs:  /94   Pulse 94   Temp 97.1 °F (36.2 °C)   Ht 153 cm (60.25\")   Wt " "97.5 kg (215 lb)   LMP  (LMP Unknown)   SpO2 97%   Breastfeeding No   BMI 41.64 kg/m²      Medications:    Current Outpatient Medications:   •  acetaminophen (TYLENOL) 325 MG tablet, Take 650 mg by mouth Every 6 (Six) Hours As Needed for Mild Pain ., Disp: , Rfl:   •  ALPRAZolam (XANAX) 0.5 MG tablet, Take 1 mg by mouth 3 (Three) Times a Day., Disp: , Rfl:   •  CHLORHEXIDINE GLUCONATE CLOTH EX, Apply  topically. AS DIRECTED: ON WRITTEN SHEET PROVIDED, Disp: , Rfl:   •  diphenhydrAMINE (BENADRYL) 25 mg capsule, Take 25 mg by mouth Every 6 (Six) Hours As Needed for Itching., Disp: , Rfl:   •  DULoxetine (CYMBALTA) 60 MG capsule, Take 60 mg by mouth Daily., Disp: , Rfl:   •  pantoprazole (PROTONIX) 40 MG EC tablet, Take 40 mg by mouth Daily., Disp: , Rfl:   •  HYDROcodone-acetaminophen (NORCO) 5-325 MG per tablet, 1-2 tabs po q 4-6hr prn pain, Disp: 15 tablet, Rfl: 0  •  HYDROcodone-acetaminophen (NORCO) 5-325 MG per tablet, 1-2 tabs po q 4-6hr. Stagger if using other sedating medications such as valium. Do not take tylenol in addition to lortab., Disp: 15 tablet, Rfl: 0  •  ondansetron (ZOFRAN) 4 MG tablet, Take 1 tablet by mouth Every 8 (Eight) Hours As Needed for Nausea or Vomiting. May take 8 mg po q8hr prn is 4mg is not effective, Disp: 10 tablet, Rfl: 1  •  polyethylene glycol (MiraLax) 17 GM/SCOOP powder, Take 17 g by mouth Daily. Take cap of powder with 8oz water daily surrounding surgery and while on narcotic, Disp: 1 each, Rfl: 0     Allergies:  No Known Allergies    Physical Examination:  /94   Pulse 94   Temp 97.1 °F (36.2 °C)   Ht 153 cm (60.25\")   Wt 97.5 kg (215 lb)   LMP  (LMP Unknown)   SpO2 97%   Breastfeeding No   BMI 41.64 kg/m²   General Appearance:  Patient is in no distress.  She is well kept and has an obese build.   Psychiatric:  Patient with appropriate mood and affect. Alert and oriented to self, time, and place.    Breast, RIGHT:  large sized, 40D,  asymmetric with the " contralateral side, as below.  Breast skin is without erythema, edema, rashes.  There are no other visible abnormalities upon inspection during the arm-raising maneuver or with hands on hips in the sitting position. There is no nipple retraction, discharge or nipple/areolar skin changes.There are nomasses palpable in the sitting or supine positions.  Well-healing posterior lateral radial incision right breast from her 2020 excision of radial scar.  No erythema warmth or drainage.    Breast, LEFT:  large sized,40D,  asymmetric with the contralateral side, LEFt side is modestly larger than RIGHT.  Breast skin is without erythema, edema, rashes.  There are no visible abnormalities upon inspection during the arm-raising maneuver or with hands on hips in the sitting position. There is no nipple retraction, discharge or nipple/areolar skin changes.There are no masses palpable in the sitting or supine positions.    Lymphatic:  There is no axillary, cervical, infraclavicular, or supraclavicular adenopathy bilaterally.  Eyes:  Pupils are round and reactive to light.  Cardiovascular:  Heart rate and rhythm are regular.  Respiratory:  Lungs are clear bilaterally with no crackles or wheezes in any lung field.  Gastrointestinal:  Abdomen is soft, nondistended, and nontender.     Musculoskeletal:  Good strength in all 4 extremities.   There is good range of motion in both shoulders.    Skin:  No new skin lesions or rashes on the skin excluding the breast (see breast exam above).        [facility]  BILATERAL DIAGNOSTIC MAMMOGRAM WITH TOMOSYNTHESIS   VIVIAN HO  Scattered areas of fibroglandular density. Finding 1: the patient indicates pain and fullness in the left breast. No suspicious finding in the region of clinical concern. Finding 2: focal asymmetry measuring 2 cm seen in the posterior one third region of the right breast at 9 o’clock located 10 cm from the nipple. There is mild associated distortion.  BILATEAL REALTIME COMPLETE BREAST ULTRASOUND: Finding 1: no evidence of any solid mass or abnormal cystic elements. Finding 2: irregular elongated area of mixed echogenicity 18 x 8 x 8 mm. Finding 3: oval small complicated cyst 5x  4 x 5 mm sub-areolar region of the left breast. This is an incidental sonographic finding(s). IMPRESSION: Finding 1 benign negative. Finding 2: area of mixed echogenicity in the right breast is suspicious. Finding 3: Complicated cyst in the sub-areolar region of the left breast is suspicious. BIRADS Category 4.     06/11/20 [facility]   BILATERAL SCREENING MAMMOGRAM WITH TMOSYNTHESIS   VIVIAN L VIC   Scattered areas of fibroglandular density. Finding 1: developing asymmetry with associated calcifications seen in the posterior one third 9:30 o’clock region of the right breast. Finding 2: stable biopsy clip upper outer region right breast. IMPRESSION: Finding 1: developing asymmetry in the right breast requires additional evaluation. Finding 2: benign negative.  BIRADS Category 0    07/07/20 [facility]   RIGHT BREAST DIAGNOSTIC MAMMOGRAM WITH TOMOSYNTHESIS  VIVIAN L VIC   Scattered areas if fibroglandular density. Finding 1: additional evaluation developing asymmetry in the right breast, 9:30 o’clock, there is a developing asymmetry measuring 30 mm with associated amorphous calcifications in the posterior one third 9:30 o’clock region of the right breast. There is suggestion of associated architectural distortion. Finding 2: there is a biopsy clip seen in the upper outer region of the right breast. RIGHT BREAST ULTRASOUND: Finding 1: normal tissue in the posterior one third 9:30 o’clock region of the right breast. There is no sonographic correlate. Finding 2: there are no suspicious masses, areas of focal shadowing or distortion seen. IMPRESSION: Finding 1: Developing asymmetry with amorphous calcifications and distortion, together SPANNING APPROXIMATLEY 30 MM, IS SUSPICIOUS. STEREOTACTIC BIOPSY IS  RECEOMMED. BIRADS Category 4C.     Pathology:    08/20/18 [facility]  ULTRASOUND GUIDED BIOPSY   VIVIAN HO   Right breast at 9:00. A 14-gauge Achieve. A total of 5 cores [place] shaped tissue marker was placed. Demonstrate the biopsy clip to be in satisfactory position. ADDENDUM: right breast 9:00 position fibrocystic changes with Usual Hyperplasia and Apocrine Metaplasia. Negative for Carcinoma. Pathology results are radiology-pathology concordant. RECOMMENDATIONS: Bilateral breast ultrasound in 6 months.     08/20/18 [facility]  PATHOLOGY REPORT   VIVIAN HO   Right breast 9 o’clock: fibrocystic changes with usual duct hyperplasia and apocrine metaplasia. Negative for Carcinoma.     08/20/18 [facility] LEFT BREAST ULTRASOUND GUIDED CYST ASPIRATION   VIVIAN HO   Left breast sub areolar position. ¼ cc of bloody fluid. There was complete sonographic resolution. The fluid was sent to the lab for cytology. Cytology results are radiology pathology concordant.     08/20/18 [facility]  CYTOLOGY   VIVIAN HO   Left sub areolar cyst, aspiration, thin prep: no malignant cells identified. Cytologic features consistent with benign fibrocystic changes.     07/16/20 [facility]  STEREOTACTIC BIOPSY   VIVIAN HO   9:30 RIGHT BREAST. Inferior approach 12 core needle biopsy specimens were obtained using a 9 gauge Eviva. A9N0 hourglass shaped tri sourav biopsy clip was deployed within the biopsy. A two view post procedure mammogram confirmed reprehensive sampling of the finding and marker clip placement within the biopsy bed. The biopsy marker is seen at the medial and superior margin of the finding. A post biopsy hematoma was noted, with the most organized component measuring at least 4 cm. IMPRESSION: A two view mammogram shows the hourglass clip in the expected location, at the medial and superior margin of the sampled finding. Pathology is high risk and concordant.     07/16/20 [facility]   PATHOLOGY  VIVIAN HO   Breast Right 9:30 o’clock core  needle biopsy: radial sclerosing lesion with associated usual ductal hyperplasia, apocrine metaplasia and ecstatic duct/microcysts. Microcalifications (calcium phosphate and calcium oxalate) associated with radial sclerosing lesion.     Pathology from right breast needle localized excision of radial scar October 1, 2020 returned as  Benign breast tissue with extensive hemorrhagic biopsy site with clip retrieved.  Fibrocystic change.  No atypical hyperplasia in situ or invasive carcinoma.  We will let her know.         Final Diagnosis   1. Right Breast, Oriented Needle Localization Lumpectomy (32 grams):               A. Benign breast tissue with extensive hemorrhagic biopsy site (clip retrieved).               B. Scattered foci of florid duct hyperplasia.               C. Fibrocystic change and apocrine cysts.               D. No atypical hyperplasia, in situ, nor invasive carcinoma identified (margins clear).     swm/pkm    Electronically signed by Mayra Partida MD on 10/2/2020 at 1249   Comment     Representative slides from this are shared internally with Francesca Mantilla and Mauri, who concur.                Procedures:      Assessment:   Diagnosis Plan   1. Radial scar of breast  Mammo Diagnostic Digital Tomosynthesis Bilateral With CAD   2. Hematoma  Mammo Diagnostic Digital Tomosynthesis Bilateral With CAD   3. Breast asymmetry     4. Class 2 obesity due to excess calories without serious comorbidity with body mass index (BMI) of 35.0 to 35.9 in adult     5. Fibrocystic disease of right breast       1-2  RIGHT 9:30, posterior third- 3 cm asymmetry on mammogram, no US correlate- hourglass marker at the superior medial margin of the lesion.  Greater than 4 cm hematoma is present.  Radial sclerosing lesion with usual hyperplasia, apocrine metaplasia, ectatic ducts. Recommend excision  Note that the hourglass marker is inferior and medial to the old cork marker.    Pathology from right breast needle  localized excision of radial scar October 1, 2020 returned as  Benign breast tissue with extensive hemorrhagic biopsy site with clip retrieved.  Fibrocystic change.  No atypical hyperplasia in situ or invasive carcinoma.      3-  LEFT > RIGHT at baseline, modestly    4-  BMI 35    5-  August 2018 right breast core biopsy 9:00, fibrocystic change, usual hyperplasia, apocrine metaplasia.  Cork marker.    Plan:  The patient goes by Tori.    She is doing well today at her postoperative visit.  She is healing nicely.  Her next follow-up imaging will be June 12, 2021 this will be the time of her routine screening.  [facility] and we will see her back after.  Asked her to continue her self breast exam and to call us in the interim with concerns would be happy to see her back sooner.    Margarita Hoang MD        Next Appointment:  Return for Next scheduled follow up, after imaging.      EMR Dragon/transcription disclaimer:    Much of this encounter note is an electronic transcription/translocation of spoken language to printed text.  The electronic translation of spoken language may permit erroneous, or at times, nonsensical words or phrases to be inadvertently transcribed.  Although I have reviewed the note from such areas, some may still exist.

## 2020-10-28 ENCOUNTER — TELEPHONE (OUTPATIENT)
Dept: SURGERY | Facility: CLINIC | Age: 50
End: 2020-10-28

## 2020-10-28 NOTE — TELEPHONE ENCOUNTER
Left message on phone for patient.    Scheduled Bilateral Diagnostic Mammogram @ [facility] 6-14-21 @ 10:30    Return to see Dr. thompson 6-30-21 arrive 12:45      Yamile

## 2021-03-24 ENCOUNTER — BULK ORDERING (OUTPATIENT)
Dept: CASE MANAGEMENT | Facility: OTHER | Age: 51
End: 2021-03-24

## 2021-03-24 DIAGNOSIS — Z23 IMMUNIZATION DUE: ICD-10-CM

## 2021-06-22 ENCOUNTER — TELEPHONE (OUTPATIENT)
Dept: SURGERY | Facility: CLINIC | Age: 51
End: 2021-06-22

## 2021-06-22 NOTE — TELEPHONE ENCOUNTER
Pt no showed for her imaging on 6-14-21 with [facility].  I have left her a message to call me, so we may reschedule.    Yamile

## 2021-09-03 ENCOUNTER — TELEPHONE (OUTPATIENT)
Dept: SURGERY | Facility: CLINIC | Age: 51
End: 2021-09-03

## 2021-09-27 ENCOUNTER — APPOINTMENT (OUTPATIENT)
Dept: WOMENS IMAGING | Facility: HOSPITAL | Age: 51
End: 2021-09-27

## 2021-09-27 PROCEDURE — 77066 DX MAMMO INCL CAD BI: CPT | Performed by: RADIOLOGY

## 2021-09-27 PROCEDURE — G0279 TOMOSYNTHESIS, MAMMO: HCPCS | Performed by: RADIOLOGY

## 2021-09-27 PROCEDURE — 77062 BREAST TOMOSYNTHESIS BI: CPT | Performed by: RADIOLOGY

## 2021-09-29 ENCOUNTER — TELEPHONE (OUTPATIENT)
Dept: SURGERY | Facility: CLINIC | Age: 51
End: 2021-09-29

## 2021-09-29 NOTE — TELEPHONE ENCOUNTER
[facility].  Bilateral screening mammogram with tomosynthesis.  Scattered areas of fibroglandular density.  BI-RADS 2

## 2021-10-21 ENCOUNTER — TELEPHONE (OUTPATIENT)
Dept: SURGERY | Facility: CLINIC | Age: 51
End: 2021-10-21

## 2021-10-21 NOTE — TELEPHONE ENCOUNTER
[facility]. Bilateral diagnostic mammogram with tomosynthesis.  BI-RADS 2 scattered areas of fibroglandular density. Postsurgical scar on the right.

## 2021-11-15 ENCOUNTER — TELEPHONE (OUTPATIENT)
Dept: SURGERY | Facility: CLINIC | Age: 51
End: 2021-11-15

## 2021-11-15 NOTE — TELEPHONE ENCOUNTER
Appointment with Dr. Hoang on 11/17/20921 has been moved to 12/20/2021 @ 10:00am.    Called and spoke to patient, patient expressed v/u of appointment time.

## 2021-12-19 ENCOUNTER — TELEPHONE (OUTPATIENT)
Dept: SURGERY | Facility: CLINIC | Age: 51
End: 2021-12-19

## 2021-12-19 NOTE — TELEPHONE ENCOUNTER
Called patient to let her know that due to scheduling conflict, appointment with Dr. Hoang on 12/20/2021 has been cancelled.     We will call patient back to reschedule patient.

## 2022-02-25 ENCOUNTER — TELEPHONE (OUTPATIENT)
Dept: SURGERY | Facility: CLINIC | Age: 52
End: 2022-02-25

## 2022-02-25 NOTE — TELEPHONE ENCOUNTER
Patient let me know that she morales not want to continue follow up with Dr. Hoang. She is doing well and having mammograms through her OB GYN Dr Kamini Charles.

## 2024-05-28 ENCOUNTER — LAB REQUISITION (OUTPATIENT)
Dept: LAB | Facility: HOSPITAL | Age: 54
End: 2024-05-28
Payer: COMMERCIAL

## 2024-05-28 DIAGNOSIS — R13.10 DYSPHAGIA, UNSPECIFIED TYPE: ICD-10-CM

## 2024-05-28 PROCEDURE — 88305 TISSUE EXAM BY PATHOLOGIST: CPT | Performed by: INTERNAL MEDICINE

## 2024-05-29 LAB
LAB AP CASE REPORT: NORMAL
PATH REPORT.FINAL DX SPEC: NORMAL
PATH REPORT.GROSS SPEC: NORMAL

## (undated) DEVICE — DRSNG WND GZ PAD BORDERED 4X8IN STRL

## (undated) DEVICE — ANTIBACTERIAL UNDYED BRAIDED (POLYGLACTIN 910), SYNTHETIC ABSORBABLE SUTURE: Brand: COATED VICRYL

## (undated) DEVICE — SUT MNCRYL PLS ANTIB UD 4/0 PS2 18IN

## (undated) DEVICE — 3M™ STERI-STRIP™ REINFORCED ADHESIVE SKIN CLOSURES, R1547, 1/2 IN X 4 IN (12 MM X 100 MM), 6 STRIPS/ENVELOPE: Brand: 3M™ STERI-STRIP™

## (undated) DEVICE — TRAP FLD MINIVAC MEGADYNE 100ML

## (undated) DEVICE — INTENDED FOR TISSUE SEPARATION, AND OTHER PROCEDURES THAT REQUIRE A SHARP SURGICAL BLADE TO PUNCTURE OR CUT.: Brand: BARD-PARKER ® CARBON RIB-BACK BLADES

## (undated) DEVICE — PK CHST BRST 40

## (undated) DEVICE — NDL FLTR 19G 1 1/2 LF

## (undated) DEVICE — SKIN PREP TRAY W/CHG: Brand: MEDLINE INDUSTRIES, INC.

## (undated) DEVICE — DRSNG WND BORDR/ADHS NONADHR/GZ LF 4X4IN STRL

## (undated) DEVICE — PENCL E/S ULTRAVAC TELESCP NOSE HOLSTR 10FT

## (undated) DEVICE — BNDG ELAS ELITE V/CLOSE 6IN 5YD LF STRL

## (undated) DEVICE — GLV SURG BIOGEL LTX PF 7

## (undated) DEVICE — SUT VIC 3/0 TIES 18IN J110T